# Patient Record
Sex: MALE | Race: BLACK OR AFRICAN AMERICAN | NOT HISPANIC OR LATINO | Employment: UNEMPLOYED | URBAN - METROPOLITAN AREA
[De-identification: names, ages, dates, MRNs, and addresses within clinical notes are randomized per-mention and may not be internally consistent; named-entity substitution may affect disease eponyms.]

---

## 2017-10-05 ENCOUNTER — HOSPITAL ENCOUNTER (EMERGENCY)
Facility: HOSPITAL | Age: 22
Discharge: HOME | End: 2017-10-05
Payer: MEDICAID

## 2017-10-05 VITALS
DIASTOLIC BLOOD PRESSURE: 64 MMHG | HEIGHT: 70 IN | OXYGEN SATURATION: 96 % | HEART RATE: 85 BPM | TEMPERATURE: 98 F | BODY MASS INDEX: 30.21 KG/M2 | RESPIRATION RATE: 18 BRPM | WEIGHT: 211 LBS | SYSTOLIC BLOOD PRESSURE: 117 MMHG

## 2017-10-05 DIAGNOSIS — T14.8XXA MUSCLE STRAIN: Primary | ICD-10-CM

## 2017-10-05 PROCEDURE — 63600175 PHARM REV CODE 636 W HCPCS: Performed by: PHYSICIAN ASSISTANT

## 2017-10-05 PROCEDURE — 96372 THER/PROPH/DIAG INJ SC/IM: CPT

## 2017-10-05 PROCEDURE — 99283 EMERGENCY DEPT VISIT LOW MDM: CPT | Mod: 25

## 2017-10-05 RX ORDER — ORPHENADRINE CITRATE 30 MG/ML
30 INJECTION INTRAMUSCULAR; INTRAVENOUS
Status: COMPLETED | OUTPATIENT
Start: 2017-10-05 | End: 2017-10-05

## 2017-10-05 RX ORDER — KETOROLAC TROMETHAMINE 30 MG/ML
10 INJECTION, SOLUTION INTRAMUSCULAR; INTRAVENOUS
Status: COMPLETED | OUTPATIENT
Start: 2017-10-05 | End: 2017-10-05

## 2017-10-05 RX ORDER — KETOROLAC TROMETHAMINE 10 MG/1
10 TABLET, FILM COATED ORAL 3 TIMES DAILY PRN
Qty: 20 TABLET | Refills: 0 | Status: SHIPPED | OUTPATIENT
Start: 2017-10-05 | End: 2018-03-10

## 2017-10-05 RX ORDER — ORPHENADRINE CITRATE 100 MG/1
100 TABLET, EXTENDED RELEASE ORAL 2 TIMES DAILY PRN
Qty: 20 TABLET | Refills: 0 | Status: SHIPPED | OUTPATIENT
Start: 2017-10-05 | End: 2017-10-15

## 2017-10-05 RX ADMIN — ORPHENADRINE CITRATE 30 MG: 30 INJECTION INTRAMUSCULAR; INTRAVENOUS at 07:10

## 2017-10-05 RX ADMIN — KETOROLAC TROMETHAMINE 10 MG: 30 INJECTION, SOLUTION INTRAMUSCULAR at 07:10

## 2017-10-05 NOTE — DISCHARGE INSTRUCTIONS
Take medications as prescribed.  Follow-up with primary care physician this week for reevaluation of symptoms.  Return to this ED if any problems occur.

## 2017-10-05 NOTE — ED PROVIDER NOTES
Encounter Date: 10/5/2017    SCRIBE #1 NOTE: I, Michelle Younger, am scribing for, and in the presence of,  Deric Carlson PA-C. I have scribed the following portions of the note - Other sections scribed: HPI and ROS.       History     Chief Complaint   Patient presents with    Back Pain     States he has upper right back pain by his neck since early this morning and describes it as a stinging pain     CC: Back Pain    HPI: This 21 y.o. Male presents to the ED for an evaluation of acute, constant, 10/10 right upper back pain with associated right sided neck pain that began early this morning.  Patient reports his pain began while lying down watching television.  Patient reports a past history of lower back pain described as spasms.  Patient reports his neck pain is worse with turning his head.  Patient denies fever, chills, nausea, emesis, diarrhea, abdominal pain, chest pain, shortness of breath, extremity numbness, extremity weakness, rash, or any other associated symptoms.  No prior tx.  No alleviating factors.      The history is provided by the patient. No  was used.     Review of patient's allergies indicates:  No Known Allergies  No past medical history on file.  No past surgical history on file.  Family History   Problem Relation Age of Onset    Diabetes Mother     Diabetes Father     Diabetes Maternal Grandmother     Diabetes Paternal Grandmother     Hypertension Maternal Grandmother     Asthma Brother     Cancer Maternal Grandfather      LUNG    Heart failure Maternal Grandfather      Social History   Substance Use Topics    Smoking status: Never Smoker    Smokeless tobacco: Never Used    Alcohol use No      Comment: OCCAS.     Review of Systems   Constitutional: Negative for chills and fever.   HENT: Negative for ear pain and sore throat.    Eyes: Negative for pain.   Respiratory: Negative for cough and shortness of breath.    Cardiovascular: Negative for chest pain.    Gastrointestinal: Negative for abdominal pain, diarrhea, nausea and vomiting.   Genitourinary: Negative for dysuria.   Musculoskeletal: Positive for back pain and neck pain.   Skin: Negative for rash.   Neurological: Negative for weakness, numbness and headaches.       Physical Exam     Initial Vitals [10/05/17 0712]   BP Pulse Resp Temp SpO2   127/72 63 18 97.9 °F (36.6 °C) 97 %      MAP       90.33         Physical Exam    Nursing note and vitals reviewed.  Constitutional: He appears well-developed and well-nourished. He is not diaphoretic. No distress.   HENT:   Head: Normocephalic and atraumatic.   Eyes: Conjunctivae and EOM are normal. Pupils are equal, round, and reactive to light.   Neck: Normal range of motion. Neck supple.   Cardiovascular: Normal heart sounds.   Pulmonary/Chest: Breath sounds normal. No respiratory distress. He has no wheezes. He has no rhonchi. He exhibits no tenderness.   Abdominal: Soft. Bowel sounds are normal. He exhibits no distension and no mass. There is no tenderness. There is no rebound and no guarding.   Musculoskeletal:   Mild ttp to R posterior trapeziua, lateral SCM. No obvious swelling or mass. No bony tenderness. Mild discomfort with active neck range of motion, but full range of motion.  No crepitus.  No midline spinal C/T/L tenderness to palpation.   Neurological: He is alert and oriented to person, place, and time. He has normal strength.   Skin: Skin is warm and dry. Capillary refill takes less than 2 seconds. No rash and no abscess noted. No erythema.   Psychiatric: He has a normal mood and affect. His behavior is normal. Judgment and thought content normal.         ED Course   Procedures  Labs Reviewed - No data to display          Medical Decision Making:   Initial Assessment:   21-year-old male chief complaint right neck and upper back stiffness and pain since this morning.  Differential Diagnosis:   Muscle strain, fracture, contusion  ED Management:  Patient  overall well-appearing, in no acute distress, afebrile, vitals within normal limits.    Patient presents to ED complaining ofacute right-sided neck and upper back discomfort after waking up this morning.  Patient denies recent heavy lifting or strenuous activity. Patient denies history of neck/upper back issues.  He admits to discomfort with active range of motion of neck.  Neck remains supple.  There is full range of motion.  He denies headache.  There is no meningismus.  No midline spinal C/T/L tenderness to palpation.  Mild TTP right posterior trapezius, right lateral SCM.  No swelling, no mass.  No bony deformity or step-off.  Overall, suspect muscle strain.  I will discharge with Norflex and Toradol in the patient follow with primary care physician.  He does understand and agree.  I've asked him to return to this ED if any other problems occur.  He is afebrile, vitals are reassuring.  I do feel he is safe and stable for discharge.            Scribe Attestation:   Scribe #1: I performed the above scribed service and the documentation accurately describes the services I performed. I attest to the accuracy of the note.    Attending Attestation:           Physician Attestation for Scribe:  Physician Attestation Statement for Scribe #1: I, Deric Carlson PA-C, reviewed documentation, as scribed by Michelle Younger in my presence, and it is both accurate and complete.                 ED Course      Clinical Impression:   The encounter diagnosis was Muscle strain.    Disposition:   Disposition: Discharged  Condition: Stable                        Deric Carlson PA-C  10/05/17 0815

## 2018-03-10 ENCOUNTER — HOSPITAL ENCOUNTER (EMERGENCY)
Facility: HOSPITAL | Age: 23
Discharge: HOME | End: 2018-03-10
Attending: EMERGENCY MEDICINE
Payer: MEDICAID

## 2018-03-10 VITALS
OXYGEN SATURATION: 94 % | SYSTOLIC BLOOD PRESSURE: 146 MMHG | WEIGHT: 180 LBS | TEMPERATURE: 99 F | HEIGHT: 70 IN | HEART RATE: 90 BPM | DIASTOLIC BLOOD PRESSURE: 66 MMHG | BODY MASS INDEX: 25.77 KG/M2 | RESPIRATION RATE: 18 BRPM

## 2018-03-10 DIAGNOSIS — J06.9 VIRAL URI: ICD-10-CM

## 2018-03-10 DIAGNOSIS — R68.89 FLU-LIKE SYMPTOMS: Primary | ICD-10-CM

## 2018-03-10 DIAGNOSIS — R05.9 COUGH: ICD-10-CM

## 2018-03-10 PROCEDURE — 63600175 PHARM REV CODE 636 W HCPCS: Performed by: NURSE PRACTITIONER

## 2018-03-10 PROCEDURE — 96372 THER/PROPH/DIAG INJ SC/IM: CPT

## 2018-03-10 PROCEDURE — 25000003 PHARM REV CODE 250: Performed by: NURSE PRACTITIONER

## 2018-03-10 PROCEDURE — 99284 EMERGENCY DEPT VISIT MOD MDM: CPT | Mod: 25

## 2018-03-10 RX ORDER — BENZONATATE 100 MG/1
100 CAPSULE ORAL 3 TIMES DAILY PRN
Qty: 20 CAPSULE | Refills: 0 | Status: SHIPPED | OUTPATIENT
Start: 2018-03-10 | End: 2018-03-20

## 2018-03-10 RX ORDER — KETOROLAC TROMETHAMINE 30 MG/ML
10 INJECTION, SOLUTION INTRAMUSCULAR; INTRAVENOUS
Status: COMPLETED | OUTPATIENT
Start: 2018-03-10 | End: 2018-03-10

## 2018-03-10 RX ORDER — OSELTAMIVIR PHOSPHATE 75 MG/1
75 CAPSULE ORAL
Status: COMPLETED | OUTPATIENT
Start: 2018-03-10 | End: 2018-03-10

## 2018-03-10 RX ORDER — AZELASTINE 1 MG/ML
1 SPRAY, METERED NASAL 2 TIMES DAILY
Qty: 30 ML | Refills: 0 | OUTPATIENT
Start: 2018-03-10 | End: 2023-05-07

## 2018-03-10 RX ORDER — OSELTAMIVIR PHOSPHATE 75 MG/1
75 CAPSULE ORAL 2 TIMES DAILY
Qty: 10 CAPSULE | Refills: 0 | Status: SHIPPED | OUTPATIENT
Start: 2018-03-11 | End: 2018-03-16

## 2018-03-10 RX ORDER — EFAVIRENZ, EMTRICITABINE AND TENOFOVIR DISOPROXIL FUMARATE 600; 200; 300 MG/1; MG/1; MG/1
1 TABLET, FILM COATED ORAL NIGHTLY
COMMUNITY
End: 2022-06-11

## 2018-03-10 RX ADMIN — OSELTAMIVIR PHOSPHATE 75 MG: 75 CAPSULE ORAL at 07:03

## 2018-03-10 RX ADMIN — KETOROLAC TROMETHAMINE 10 MG: 30 INJECTION, SOLUTION INTRAMUSCULAR at 07:03

## 2018-03-11 NOTE — ED PROVIDER NOTES
Encounter Date: 3/10/2018    SCRIBE #1 NOTE: I, Jinny Leyva, am scribing for, and in the presence of,  Mariposa Bryant NP. I have scribed the following portions of the note - Other sections scribed: HPI and ROS.       History     Chief Complaint   Patient presents with    Cough     bodyaches x 3 days; cough x 2 days ago with fever; denies N/V/D; reports loose stools    Generalized Body Aches     CC: Cough    HPI: This 22 y.o. male with a medical history of HIV presents to the ED c/o a productive cough accompanied by green mucus for the past 3x days. Pt reports that he has also been experiencing chest pain (with coughing), shortness of breath (when taking deep breath, coughing or laughing), congestion, rhinorrhea, generalized body aches, fatigue, decreased appetite, subjective fever and chills. He reports taking Theraflu (last taken yesterday), Nyquil (taken PTA to the ED) and Tylenol (last taken at 10:00 am this morning) for treatment. Symptoms are acute, constant and severe (9/10). Pt reports that he received a flu vaccination this year, but notes that he has been in contact with individuals with similar symptoms. Pt denies ear pain, eye pain, sore throat, abdominal pain, nausea, emesis, diarrhea, dysuria and urinary frequency. No other associated symptoms. No alleviating factors.     Pt reports that he was diagnosed with HIV in 2014 and notes that he was last evaluated in December 2017 (notes he has been missing appointments due to work). He states that he is compliant with his medications and notes that the virus is currently undetectable.           The history is provided by the patient. No  was used.     Review of patient's allergies indicates:  No Known Allergies  Past Medical History:   Diagnosis Date    HIV (human immunodeficiency virus infection)      History reviewed. No pertinent surgical history.  Family History   Problem Relation Age of Onset    Cancer Maternal Grandfather       LUNG    Heart failure Maternal Grandfather     Diabetes Mother     Diabetes Father     Diabetes Maternal Grandmother     Hypertension Maternal Grandmother     Diabetes Paternal Grandmother     Asthma Brother      Social History   Substance Use Topics    Smoking status: Never Smoker    Smokeless tobacco: Never Used    Alcohol use No      Comment: OCCAS.     Review of Systems   Constitutional: Positive for appetite change (decreased), chills, fatigue and fever (subjective).   HENT: Positive for congestion and rhinorrhea. Negative for ear pain and sore throat.    Eyes: Negative for pain.   Respiratory: Positive for cough (productive; accompanied by green mucus) and shortness of breath (when taking a deep breath, coughing or laughing).    Cardiovascular: Positive for chest pain (when coughing).   Gastrointestinal: Negative for abdominal pain, diarrhea, nausea and vomiting.   Genitourinary: Negative for dysuria and frequency.   Musculoskeletal: Positive for myalgias (generalized).   Skin: Negative for rash.       Physical Exam     Initial Vitals [03/10/18 1822]   BP Pulse Resp Temp SpO2   131/61 88 20 98.3 °F (36.8 °C) 95 %      MAP       84.33         Physical Exam    Vitals reviewed.  Constitutional: Vital signs are normal. He appears well-developed and well-nourished. He is not diaphoretic.  Non-toxic appearance. He does not have a sickly appearance. He does not appear ill. No distress.   HENT:   Head: Normocephalic and atraumatic.   Right Ear: Hearing, tympanic membrane, external ear and ear canal normal. Tympanic membrane is not erythematous and not bulging. No middle ear effusion.   Left Ear: Hearing, tympanic membrane, external ear and ear canal normal. Tympanic membrane is not erythematous and not bulging.  No middle ear effusion.   Nose: Mucosal edema and rhinorrhea present. Right sinus exhibits no maxillary sinus tenderness and no frontal sinus tenderness. Left sinus exhibits no maxillary sinus  tenderness and no frontal sinus tenderness.   Mouth/Throat: Uvula is midline, oropharynx is clear and moist and mucous membranes are normal. Mucous membranes are not pale, not dry and not cyanotic. No oral lesions. No trismus in the jaw. No uvula swelling. No oropharyngeal exudate, posterior oropharyngeal edema, posterior oropharyngeal erythema or tonsillar abscesses.   Eyes: Conjunctivae and EOM are normal. Pupils are equal, round, and reactive to light. Right eye exhibits no discharge. Left eye exhibits no discharge.   Neck: Normal range of motion and full passive range of motion without pain. Neck supple. No thyromegaly present. No tracheal deviation present. No JVD present.   Cardiovascular: Normal rate, regular rhythm and normal heart sounds. Exam reveals no gallop and no friction rub.    No murmur heard.  Pulmonary/Chest: Breath sounds normal. No stridor. No respiratory distress. He has no decreased breath sounds. He has no wheezes. He has no rhonchi. He has no rales. He exhibits no tenderness.   Abdominal: Soft. Bowel sounds are normal. He exhibits no distension and no mass. There is no hepatosplenomegaly. There is no tenderness. There is no rigidity, no rebound, no guarding, no CVA tenderness, no tenderness at McBurney's point and negative Hinojosa's sign.   Musculoskeletal: Normal range of motion.   Lymphadenopathy:     He has no cervical adenopathy.   Neurological: He is alert and oriented to person, place, and time. GCS eye subscore is 4. GCS verbal subscore is 5. GCS motor subscore is 6.   Skin: Skin is warm, dry and intact. No rash noted. No erythema.   Psychiatric: He has a normal mood and affect. Thought content normal.         ED Course   Procedures  Labs Reviewed - No data to display          Medical Decision Making:   ED Management:  This is an evaluation of a 22 y.o. male that presents to the Emergency Department for cough, body aches, rhinorrhea and nasal congestion for 3 days. The patient is a  non-toxic, afebrile, and well appearing male. On physical exam ears and pharynx are without evidence of infection. Appears well hydrated with moist mucus membranes. Neck soft and supple with no meningeal signs or cervical lymphadenopathy. Breath sounds are clear and equal bilaterally with no adventitious breath sounds, tachypnea or respiratory distress with room air pulse ox of 95% and no evidence of hypoxia.  Abdomen is soft and nontender with palpation.    Vital Signs Are Reassuring.  RESULTS:   Chest x-ray PA and lateral with no acute cardiopulmonary abnormality.    My overall impression is flu-like symptoms. I considered, but at this time, do not suspect OM, OE, strep pharyngitis, meningitis, pneumonia, or acute bacterial sinusitis.    He is well-appearing.  Vital signs are within normal limits.  He is afebrile.  He is not tachycardic.  He reports good control of HIV with undetectable viral load.  He has flulike symptoms, therefore I will treat him with Tamiflu.  He was instructed to return to the emergency Department for fever, chills, any worsening of symptoms, or if he does not improve.  He instructed to call his PCP and infectious disease doctor soon as possible for follow-up.Case was discussed with Dr. Aly does not believe the patient requires any labs or further workup at this time.     ED Course: Tamiflu, Toradol. D/C Meds: Tamiflu, Astelin nasal spray,Tessalon Perles.The diagnosis, treatment plan, instructions for follow-up and reevaluation with PCP and ID as well as ED return precautions were discussed and understanding was verbalized. All questions or concerns have been addressed.     This case was discussed with Dr. Aly who is in agreement with my assessment and plan.             Scribe Attestation:   Scribe #1: I performed the above scribed service and the documentation accurately describes the services I performed. I attest to the accuracy of the note.    Attending Attestation:   Physician  Attestation Statement for Resident:  As the supervising MD   Physician Attestation Statement: I have personally seen and examined this patient.    Physician Attestation Statement for NP/PA:   I discussed this assessment and plan of this patient with the NP/PA, but I did not personally examine the patient. The face to face encounter was performed by the NP/PA.        Physician Attestation for Scribe:  Physician Attestation Statement for Scribe #1: I, Mariposa Bryant NP, reviewed documentation, as scribed by Jinny Leyva in my presence, and it is both accurate and complete.                    Clinical Impression:   The primary encounter diagnosis was Flu-like symptoms. Diagnoses of Cough and Viral URI were also pertinent to this visit.    Disposition:   Disposition: Discharged  Condition: Stable                        Mariposa Bryant NP  03/11/18 2010       Teodoro Aly MD  03/14/18 1822

## 2018-03-11 NOTE — DISCHARGE INSTRUCTIONS
You have been prescribed Tamiflu.  You were given your first dose of this medication tonight in the emergency department.  Begin taking this medication tomorrow.  Table take it twice a day for 5 days.  Alternate ibuprofen and Tylenol as needed for fever.  Call as soon as possible to schedule follow-up appointment with your infectious disease doctor.    Please return to the Emergency Department for any new, worrisome, or worsening symptoms including: Abdominal pain, fever, chest pain, shortness of breath, loss of consciousness, dizziness, weakness, or any other concerns.     Please follow up with your Primary Care Provider within in the week. If you do not have one, you may contact the one listed on your discharge paperwork or you may also call the [facility] Appointment Desk at [phone] to schedule an appointment with one.     Please take all medication as prescribed.

## 2018-03-11 NOTE — ED TRIAGE NOTES
Pt states he has been coughing x3 days. C/o body aches, chills, congestion, possible fever, loss of apettie. Denies sore throat, N/V.   Pt took tylenol, theraflu, and nyquil PTA.

## 2021-05-21 ENCOUNTER — HOSPITAL ENCOUNTER (EMERGENCY)
Facility: HOSPITAL | Age: 26
Discharge: HOME | End: 2021-05-21
Attending: EMERGENCY MEDICINE

## 2021-05-21 VITALS
HEART RATE: 72 BPM | TEMPERATURE: 99 F | HEIGHT: 70 IN | BODY MASS INDEX: 30.06 KG/M2 | OXYGEN SATURATION: 97 % | SYSTOLIC BLOOD PRESSURE: 140 MMHG | WEIGHT: 210 LBS | RESPIRATION RATE: 17 BRPM | DIASTOLIC BLOOD PRESSURE: 73 MMHG

## 2021-05-21 DIAGNOSIS — S39.012A ACUTE MYOFASCIAL STRAIN OF LUMBAR REGION, INITIAL ENCOUNTER: Primary | ICD-10-CM

## 2021-05-21 PROCEDURE — 63600175 PHARM REV CODE 636 W HCPCS: Performed by: NURSE PRACTITIONER

## 2021-05-21 PROCEDURE — 96372 THER/PROPH/DIAG INJ SC/IM: CPT

## 2021-05-21 PROCEDURE — 99284 EMERGENCY DEPT VISIT MOD MDM: CPT | Mod: 25

## 2021-05-21 RX ORDER — TIZANIDINE 4 MG/1
4 TABLET ORAL EVERY 8 HOURS PRN
Qty: 20 TABLET | Refills: 0 | OUTPATIENT
Start: 2021-05-21 | End: 2022-06-11

## 2021-05-21 RX ORDER — NAPROXEN 500 MG/1
500 TABLET ORAL EVERY 12 HOURS PRN
Qty: 20 TABLET | Refills: 0 | OUTPATIENT
Start: 2021-05-21 | End: 2022-06-11

## 2021-05-21 RX ORDER — DEXAMETHASONE SODIUM PHOSPHATE 4 MG/ML
12 INJECTION, SOLUTION INTRA-ARTICULAR; INTRALESIONAL; INTRAMUSCULAR; INTRAVENOUS; SOFT TISSUE
Status: COMPLETED | OUTPATIENT
Start: 2021-05-21 | End: 2021-05-21

## 2021-05-21 RX ADMIN — DEXAMETHASONE SODIUM PHOSPHATE 12 MG: 4 INJECTION INTRA-ARTICULAR; INTRALESIONAL; INTRAMUSCULAR; INTRAVENOUS; SOFT TISSUE at 07:05

## 2022-01-03 ENCOUNTER — LAB VISIT (OUTPATIENT)
Dept: PRIMARY CARE CLINIC | Facility: OTHER | Age: 27
End: 2022-01-03
Payer: OTHER GOVERNMENT

## 2022-01-03 DIAGNOSIS — R05.9 COUGH: ICD-10-CM

## 2022-01-03 PROCEDURE — U0003 INFECTIOUS AGENT DETECTION BY NUCLEIC ACID (DNA OR RNA); SEVERE ACUTE RESPIRATORY SYNDROME CORONAVIRUS 2 (SARS-COV-2) (CORONAVIRUS DISEASE [COVID-19]), AMPLIFIED PROBE TECHNIQUE, MAKING USE OF HIGH THROUGHPUT TECHNOLOGIES AS DESCRIBED BY CMS-2020-01-R: HCPCS | Performed by: INTERNAL MEDICINE

## 2022-01-05 ENCOUNTER — PATIENT MESSAGE (OUTPATIENT)
Dept: ADMINISTRATIVE | Facility: OTHER | Age: 27
End: 2022-01-05
Payer: OTHER GOVERNMENT

## 2022-01-07 LAB
SARS-COV-2 RNA RESP QL NAA+PROBE: NORMAL
TEST PERFORMANCE INFO SPEC: NORMAL

## 2022-06-11 ENCOUNTER — HOSPITAL ENCOUNTER (EMERGENCY)
Facility: HOSPITAL | Age: 27
Discharge: HOME | End: 2022-06-11
Attending: EMERGENCY MEDICINE
Payer: COMMERCIAL

## 2022-06-11 VITALS
OXYGEN SATURATION: 97 % | DIASTOLIC BLOOD PRESSURE: 82 MMHG | SYSTOLIC BLOOD PRESSURE: 123 MMHG | RESPIRATION RATE: 20 BRPM | TEMPERATURE: 98 F | BODY MASS INDEX: 30.35 KG/M2 | WEIGHT: 212 LBS | HEIGHT: 70 IN | HEART RATE: 77 BPM

## 2022-06-11 DIAGNOSIS — S76.112A QUADRICEPS STRAIN, LEFT, INITIAL ENCOUNTER: ICD-10-CM

## 2022-06-11 DIAGNOSIS — M54.42 ACUTE LEFT-SIDED LOW BACK PAIN WITH LEFT-SIDED SCIATICA: Primary | ICD-10-CM

## 2022-06-11 PROCEDURE — 96372 THER/PROPH/DIAG INJ SC/IM: CPT | Performed by: NURSE PRACTITIONER

## 2022-06-11 PROCEDURE — 63600175 PHARM REV CODE 636 W HCPCS: Performed by: NURSE PRACTITIONER

## 2022-06-11 PROCEDURE — 99284 EMERGENCY DEPT VISIT MOD MDM: CPT | Mod: 25

## 2022-06-11 PROCEDURE — 25000003 PHARM REV CODE 250: Performed by: NURSE PRACTITIONER

## 2022-06-11 RX ORDER — KETOROLAC TROMETHAMINE 30 MG/ML
30 INJECTION, SOLUTION INTRAMUSCULAR; INTRAVENOUS
Status: COMPLETED | OUTPATIENT
Start: 2022-06-11 | End: 2022-06-11

## 2022-06-11 RX ORDER — HYDROCODONE BITARTRATE AND ACETAMINOPHEN 5; 325 MG/1; MG/1
1 TABLET ORAL EVERY 4 HOURS PRN
Qty: 18 TABLET | Refills: 0 | OUTPATIENT
Start: 2022-06-11 | End: 2023-11-01

## 2022-06-11 RX ORDER — LIDOCAINE 50 MG/G
1 PATCH TOPICAL
Status: DISCONTINUED | OUTPATIENT
Start: 2022-06-11 | End: 2022-06-11 | Stop reason: HOSPADM

## 2022-06-11 RX ORDER — METHYLPREDNISOLONE ACETATE 80 MG/ML
80 INJECTION, SUSPENSION INTRA-ARTICULAR; INTRALESIONAL; INTRAMUSCULAR; SOFT TISSUE
Status: COMPLETED | OUTPATIENT
Start: 2022-06-11 | End: 2022-06-11

## 2022-06-11 RX ORDER — LIDOCAINE 50 MG/G
1 PATCH TOPICAL DAILY
Qty: 5 PATCH | Refills: 0 | Status: SHIPPED | OUTPATIENT
Start: 2022-06-11

## 2022-06-11 RX ORDER — DIAZEPAM 10 MG/2ML
10 INJECTION INTRAMUSCULAR
Status: COMPLETED | OUTPATIENT
Start: 2022-06-11 | End: 2022-06-11

## 2022-06-11 RX ORDER — BICTEGRAVIR SODIUM, EMTRICITABINE, AND TENOFOVIR ALAFENAMIDE FUMARATE 50; 200; 25 MG/1; MG/1; MG/1
1 TABLET ORAL DAILY
COMMUNITY
Start: 2022-03-17

## 2022-06-11 RX ORDER — NAPROXEN 500 MG/1
500 TABLET ORAL 2 TIMES DAILY WITH MEALS
Qty: 10 TABLET | Refills: 0 | Status: SHIPPED | OUTPATIENT
Start: 2022-06-11 | End: 2022-06-16

## 2022-06-11 RX ORDER — METHOCARBAMOL 500 MG/1
1000 TABLET, FILM COATED ORAL 3 TIMES DAILY
Qty: 30 TABLET | Refills: 0 | Status: SHIPPED | OUTPATIENT
Start: 2022-06-11 | End: 2022-06-16

## 2022-06-11 RX ADMIN — DIAZEPAM 10 MG: 10 INJECTION, SOLUTION INTRAMUSCULAR; INTRAVENOUS at 05:06

## 2022-06-11 RX ADMIN — LIDOCAINE 1 PATCH: 50 PATCH TOPICAL at 05:06

## 2022-06-11 RX ADMIN — METHYLPREDNISOLONE ACETATE 80 MG: 80 INJECTION, SUSPENSION INTRA-ARTICULAR; INTRALESIONAL; INTRAMUSCULAR; SOFT TISSUE at 05:06

## 2022-06-11 RX ADMIN — KETOROLAC TROMETHAMINE 30 MG: 30 INJECTION, SOLUTION INTRAMUSCULAR at 05:06

## 2022-06-11 NOTE — ED PROVIDER NOTES
"Source of History:  Patient, mother, chart    Chief complaint:  Back Pain (Pt c/o of back to lower left back. Pt stated he hurt his back dancing Thursday and has worsened today down the left leg. Pt denied falling.)      HPI:  Bladimir Calderon is a 26 y.o. male presenting with left low back pain radiating down left leg.  Patient states he initially had an injury while dancing 2 days ago.  Patient states today while dancing he felt a pop in his lower back and pain radiating down his left leg.  Patient denies taking anything for pain today.  Patient denies any numbness or tingling in groin.  No bowel or bladder incontinence.    This is the extent to the patients complaints today here in the emergency department.    ROS: As per HPI and below:  Constitutional: No fever.  No chills.  Eyes: No visual changes.   ENT: No sore throat. No ear pain.  Urinary: No abnormal urination.  MSK:  Positive for back pain.  Positive for left leg pain.  Integument: No rashes or lesions.    Review of patient's allergies indicates:  No Known Allergies    PMH:  As per HPI and below:  Past Medical History:   Diagnosis Date    HIV (human immunodeficiency virus infection)      No past surgical history on file.    Social History     Tobacco Use    Smoking status: Never Smoker    Smokeless tobacco: Never Used   Substance Use Topics    Alcohol use: No     Comment: OCCAS.    Drug use: No       Physical Exam:    /86 (BP Location: Right arm, Patient Position: Sitting)   Pulse 78   Temp 98.1 °F (36.7 °C) (Oral)   Resp 20   Ht 5' 10" (1.778 m)   Wt 96.2 kg (212 lb)   SpO2 97%   BMI 30.42 kg/m²   Nursing note and vital signs reviewed.  Constitutional: No acute distress.  Nontoxic  Head:  Normocephalic atraumatic  Eyes: No conjunctival injection.  Extraocular muscles are intact.  ENT: Normal phonation.  Musculoskeletal:  Left lumbar pain- worse with lumbar flexion and extension.  No midline lumbar tenderness to palpation. No saddle " anesthesia. Tenderness to palpation to left quadriceps.  No bulging or signs of tear noted.  No bruising or ecchymosis noted.  No swelling on exam.  Skin: No rashes seen.  Good turgor.  No abrasions.  No ecchymoses.  Psych: Appropriate, conversant.    I decided to obtain the patient's medical records.      MDM/ Differential Dx:   Emergent evaluation of a 27 yo male presenting for left lower back pain and left quadriceps pain.  Patient states back pain initially started on Thursday.  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  On exam pt is A&Ox3. VSS. Nonfebrile and nontoxic appearing.  Distressed due to pain.  Left lumbar pain- worse with lumbar flexion and extension.  No midline lumbar tenderness to palpation. No saddle anesthesia. Tenderness to palpation to left quadriceps.  No bulging or signs of tear noted.  No bruising or ecchymosis noted.  No swelling on exam.  Cap refill < 3 seconds.  Plus two DP noted.    Differential diagnoses include but are not limited to lumbar strain, sprain, contusion, abrasion      I will medicate and reassess.    ED Course as of 06/11/22 1717   Sat Jun 11, 2022   1717 Patient advised use ice or heat for comfort.  Take medications as prescribed.  Maintain movement and stretches.  Follow-up with PCP as needed.  Strict return to ED precautions discussed.  Patient verbalized understanding of this plan of care.  All questions and concerns addressed. [RZ]   1717 Patient is hemodynamically stable, vital signs are normal. Discharge instructions given. Return to ED precautions discussed. Follow up as directed. Pt verbalized understanding of this plan.  Pt is stable for discharge.  [RZ]      ED Course User Index  [RZ] Rhonda Magallanes NP               Diagnostic Impression:    1. Acute left-sided low back pain with left-sided sciatica    2. Quadriceps strain, left, initial encounter         ED Disposition  Condition    Discharge Stable          ED Prescriptions     Medication Sig Dispense Start Date End Date Auth. Provider    methocarbamoL (ROBAXIN) 500 MG Tab Take 2 tablets (1,000 mg total) by mouth 3 (three) times daily. for 5 days 30 tablet 6/11/2022 6/16/2022 Rhonda Magallanes NP    naproxen (NAPROSYN) 500 MG tablet Take 1 tablet (500 mg total) by mouth 2 (two) times daily with meals. for 5 days 10 tablet 6/11/2022 6/16/2022 Rhonda Magallanes NP    LIDOcaine (LIDODERM) 5 % Place 1 patch onto the skin once daily. 5 patch 6/11/2022  Rhonda Magallanes NP    HYDROcodone-acetaminophen (NORCO) 5-325 mg per tablet Take 1 tablet by mouth every 4 (four) hours as needed for Pain. 18 tablet 6/11/2022  Rhonda Magallanes NP        Follow-up Information     Follow up With Specialties Details Why Contact Info    Sammi Carmona MD Pediatrics Schedule an appointment as soon as possible for a visit  As needed [address]  [address]  [facility]  [facility] [address]  [phone]               Rhonda Magallanes NP  06/11/22 5450

## 2022-06-11 NOTE — DISCHARGE INSTRUCTIONS
You were seen and evaluated in the ER today.  We have treated you for lumbar strain and quadriceps strain.  Please take medications as prescribed.  Ice or heat for comfort.  Maintain movement and stretches.  Please follow-up with your PCP as needed.  Please return to the ED for any worsening symptoms such as chest pain, shortness of breath, fever not controlled with Tylenol or ibuprofen or uncontrolled pain.      Our goal in the emergency department is to always give you outstanding care and exceptional service. You may receive a survey by mail or e-mail in the next week regarding your experience in our ED. We would greatly appreciate your completing and returning the survey. Your feedback provides us with a way to recognize our staff who give very good care and it helps us learn how to improve when your experience was below our aspiration of excellence.

## 2022-07-07 ENCOUNTER — HOSPITAL ENCOUNTER (EMERGENCY)
Facility: HOSPITAL | Age: 27
Discharge: HOME | End: 2022-07-07
Attending: EMERGENCY MEDICINE
Payer: COMMERCIAL

## 2022-07-07 VITALS
TEMPERATURE: 99 F | HEART RATE: 73 BPM | RESPIRATION RATE: 18 BRPM | BODY MASS INDEX: 30.06 KG/M2 | HEIGHT: 70 IN | SYSTOLIC BLOOD PRESSURE: 131 MMHG | WEIGHT: 210 LBS | OXYGEN SATURATION: 97 % | DIASTOLIC BLOOD PRESSURE: 86 MMHG

## 2022-07-07 DIAGNOSIS — S39.012A STRAIN OF LUMBAR REGION, INITIAL ENCOUNTER: ICD-10-CM

## 2022-07-07 DIAGNOSIS — J02.9 VIRAL PHARYNGITIS: Primary | ICD-10-CM

## 2022-07-07 LAB
CTP QC/QA: YES
MOLECULAR STREP A: NEGATIVE
POC MOLECULAR INFLUENZA A AGN: NEGATIVE
POC MOLECULAR INFLUENZA B AGN: NEGATIVE
SARS-COV-2 RDRP RESP QL NAA+PROBE: NEGATIVE

## 2022-07-07 PROCEDURE — 87502 INFLUENZA DNA AMP PROBE: CPT

## 2022-07-07 PROCEDURE — U0002 COVID-19 LAB TEST NON-CDC: HCPCS | Performed by: EMERGENCY MEDICINE

## 2022-07-07 PROCEDURE — 99284 EMERGENCY DEPT VISIT MOD MDM: CPT

## 2022-07-07 RX ORDER — MELOXICAM 7.5 MG/1
7.5 TABLET ORAL DAILY
Qty: 12 TABLET | Refills: 0 | Status: SHIPPED | OUTPATIENT
Start: 2022-07-07 | End: 2023-11-01 | Stop reason: ALTCHOICE

## 2022-07-07 RX ORDER — ORPHENADRINE CITRATE 100 MG/1
100 TABLET, EXTENDED RELEASE ORAL 2 TIMES DAILY
Qty: 8 TABLET | Refills: 0 | Status: SHIPPED | OUTPATIENT
Start: 2022-07-07 | End: 2022-07-11

## 2022-07-07 NOTE — ED PROVIDER NOTES
"Encounter Date: 7/7/2022    SCRIBE #1 NOTE: I, Delon Schneider, am scribing for, and in the presence of,  Mg Noguera PA-C. I have scribed the following portions of the note - Other sections scribed: HPI & ROS.       History     Chief Complaint   Patient presents with    Sore Throat     Pt reports sore throat, headache, lower back pain since yesterday. 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 Denies recent COVID exposure.     This is a 26 y.o. male, with a PMHx of HIV and chronic back pain, who presents to the ED with multiple complaints. Patient reporting 1 day of atraumatic, positional lower back pain that he describes as "sharp" and non radiating. Reports symptoms feel similar to chronic back pain. Patient additionally complaining of phalangitis, nasal congestion, cough, and headache. Reports Tylenol was taken PTA. Denies recent sick contact. No other exacerbating or alleviating factors. No other associated symptoms at this time.     The history is provided by the patient. No  was used.     Review of patient's allergies indicates:  No Known Allergies  Past Medical History:   Diagnosis Date    HIV (human immunodeficiency virus infection)      History reviewed. No pertinent surgical history.  Family History   Problem Relation Age of Onset    Cancer Maternal Grandfather         LUNG    Heart failure Maternal Grandfather     Diabetes Mother     Diabetes Father     Diabetes Maternal Grandmother     Hypertension Maternal Grandmother     Diabetes Paternal Grandmother     Asthma Brother      Social History     Tobacco Use    Smoking status: Never Smoker    Smokeless tobacco: Never Used   Substance Use Topics    Alcohol use: No     Comment: OCCAS.    Drug use: No     Review of Systems   Constitutional: Negative for fever.   HENT: Positive for congestion and sore throat.    Eyes: Negative for visual disturbance.   Respiratory: Positive for cough. Negative for " shortness of breath.    Cardiovascular: Negative for chest pain.   Gastrointestinal: Negative for abdominal pain, diarrhea, nausea and vomiting.   Genitourinary: Negative for dysuria.   Musculoskeletal: Positive for back pain (lower).   Skin: Negative for rash.   Neurological: Positive for headaches.   All other systems reviewed and are negative.      Physical Exam     Initial Vitals [07/07/22 1304]   BP Pulse Resp Temp SpO2   (!) 149/80 72 18 98.3 °F (36.8 °C) 95 %      MAP       --         Physical Exam    Nursing note and vitals reviewed.  Constitutional: He appears well-developed and well-nourished. He is not diaphoretic. No distress.   HENT:   Head: Atraumatic.   Right Ear: External ear normal.   Left Ear: External ear normal.   Mouth/Throat: Oropharynx is clear and moist.   Eyes: Conjunctivae are normal.   Neck: No tracheal deviation present.   Normal range of motion.  Cardiovascular: Normal rate and regular rhythm.   Pulmonary/Chest: No accessory muscle usage or stridor. No tachypnea. No respiratory distress.   Musculoskeletal:      Cervical back: Normal range of motion.      Comments: Reproducible TTP and with distracting movements of the b/l lower lumbar musculature. No midline tenderness or bony deformities noted down the neck and spine. No bony TTP to hips and has full ROM of hips. No overlying skin changes. Distal lower extremity pulses 2+ and equal. Sensation intact and equal. No foot drop. No lower extremity swelling or TTP.  Ambulating normally, without limp.     Neurological: He has normal strength. He displays no tremor. He displays no seizure activity. Coordination and gait normal.   Skin: Skin is intact. Capillary refill takes less than 2 seconds. No cyanosis.         ED Course   Procedures  Labs Reviewed   POCT INFLUENZA A/B MOLECULAR   SARS-COV-2 RDRP GENE   POCT STREP A MOLECULAR          Imaging Results    None          Medications - No data to display  Medical Decision Making:   History:    Old Medical Records: I decided to obtain old medical records.  Clinical Tests:   Lab Tests: Ordered and Reviewed  ED Management:  Multiple complaints.  URI symptoms likely viral.  COVID-19, flu, and rapid strep negative.  No peritonsillar abscess or deep space infection.  Afebrile.  No hypoxia or respiratory distress.  Low suspicion for bacterial pneumonia.  Back pain consistent with myofascial strain.  No history of trauma.  Low suspicion for epidural abscess and cauda equina.  No renal component today.  Advising follow-up with PCP. Strict return precautions discussed.  Agreeable to plan.          Scribe Attestation:   Scribe #1: I performed the above scribed service and the documentation accurately describes the services I performed. I attest to the accuracy of the note.                 Clinical Impression:   Final diagnoses:  [J02.9] Viral pharyngitis (Primary)  [S39.012A] Strain of lumbar region, initial encounter         I, Mg Noguera PA-C, personally performed the services described in this documentation. All medical record entries made by the scribe were at my direction and in my presence. I have reviewed the chart and agree that the record reflects my personal performance and is accurate and complete.     ED Disposition Condition    Discharge Stable        ED Prescriptions     Medication Sig Dispense Start Date End Date Auth. Provider    meloxicam (MOBIC) 7.5 MG tablet Take 1 tablet (7.5 mg total) by mouth once daily. 12 tablet 7/7/2022  Mg Noguera PA-C    orphenadrine (NORFLEX) 100 mg tablet Take 1 tablet (100 mg total) by mouth 2 (two) times daily. for 4 days 8 tablet 7/7/2022 7/11/2022 Mg Noguera PA-C    benzocaine-menthoL 15-20 mg Lozg Follows directions on packaging 16 lozenge 7/7/2022  Mg Noguera PA-C        Follow-up Information     Follow up With Specialties Details Why Contact Info    Sammi Carmona MD Pediatrics Schedule an appointment as soon as possible for a visit in 2  days For re-evaluation [address] [facility]  [address]  [facility] [facility] [address]  [facility] [address]  [phone]      [facility] - Emergency Dept Emergency Medicine Go to  If symptoms worsen [address]  [facility] [address] [phone]           Mg Noguera PA-C  07/07/22 3260

## 2022-07-07 NOTE — Clinical Note
"Bladimir BLANCA "Bladimir" Kvng was seen and treated in our emergency department on 7/7/2022.  He may return to work on 07/11/2022.       If you have any questions or concerns, please don't hesitate to call.      Mg Noguera PA-C"

## 2022-07-07 NOTE — DISCHARGE INSTRUCTIONS

## 2022-07-07 NOTE — ED TRIAGE NOTES
Bladimir Calderon, a 26 y.o. male, presents to ED via POV with complaints of sore throat, cough, runny nose, and back pain that started yesterday. Reports taking Tylenol PTA.

## 2023-05-02 ENCOUNTER — HOSPITAL ENCOUNTER (EMERGENCY)
Facility: HOSPITAL | Age: 28
Discharge: HOME | End: 2023-05-02
Attending: EMERGENCY MEDICINE
Payer: COMMERCIAL

## 2023-05-02 VITALS
SYSTOLIC BLOOD PRESSURE: 134 MMHG | HEART RATE: 98 BPM | RESPIRATION RATE: 18 BRPM | DIASTOLIC BLOOD PRESSURE: 77 MMHG | HEIGHT: 70 IN | WEIGHT: 180 LBS | TEMPERATURE: 99 F | OXYGEN SATURATION: 97 % | BODY MASS INDEX: 25.77 KG/M2

## 2023-05-02 DIAGNOSIS — J02.9 PHARYNGITIS, UNSPECIFIED ETIOLOGY: Primary | ICD-10-CM

## 2023-05-02 PROCEDURE — 99284 EMERGENCY DEPT VISIT MOD MDM: CPT

## 2023-05-02 PROCEDURE — 87651 STREP A DNA AMP PROBE: CPT

## 2023-05-02 PROCEDURE — 87502 INFLUENZA DNA AMP PROBE: CPT

## 2023-05-02 RX ORDER — PHENOL 1.4 %
AEROSOL, SPRAY (ML) MUCOUS MEMBRANE
Qty: 177 ML | Refills: 0 | Status: SHIPPED | OUTPATIENT
Start: 2023-05-02

## 2023-05-02 RX ORDER — BENZONATATE 200 MG/1
200 CAPSULE ORAL 3 TIMES DAILY PRN
Qty: 30 CAPSULE | Refills: 0 | Status: SHIPPED | OUTPATIENT
Start: 2023-05-02 | End: 2023-05-12

## 2023-05-02 RX ORDER — FLUTICASONE PROPIONATE 50 MCG
2 SPRAY, SUSPENSION (ML) NASAL DAILY
Qty: 15.8 ML | Refills: 0 | OUTPATIENT
Start: 2023-05-02 | End: 2023-05-07

## 2023-05-02 RX ORDER — IBUPROFEN 600 MG/1
600 TABLET ORAL EVERY 6 HOURS PRN
Qty: 20 TABLET | Refills: 0 | OUTPATIENT
Start: 2023-05-02 | End: 2023-05-07

## 2023-05-02 RX ORDER — CETIRIZINE HYDROCHLORIDE 10 MG/1
10 TABLET ORAL DAILY
Qty: 30 TABLET | Refills: 0 | Status: SHIPPED | OUTPATIENT
Start: 2023-05-02 | End: 2024-05-01

## 2023-05-02 RX ORDER — GUAIFENESIN/DEXTROMETHORPHAN 100-10MG/5
5 SYRUP ORAL EVERY 6 HOURS PRN
Qty: 473 ML | Refills: 0 | Status: SHIPPED | OUTPATIENT
Start: 2023-05-02 | End: 2023-05-12

## 2023-05-02 RX ORDER — ACETAMINOPHEN 500 MG
1000 TABLET ORAL EVERY 6 HOURS PRN
Qty: 56 TABLET | Refills: 0 | Status: SHIPPED | OUTPATIENT
Start: 2023-05-02 | End: 2023-05-09

## 2023-05-02 NOTE — ED PROVIDER NOTES
"Encounter Date: 5/2/2023    SCRIBE #1 NOTE: I, Taurus Ty, am scribing for, and in the presence of,  John Simpson PA-C. I have scribed the following portions of the note - Other sections scribed: HPI, ROS, PE.     History     Chief Complaint   Patient presents with    Sore Throat     Pt to ER with c/o sore throat and bilateral ear discomfort since this morning      Bladimir Calderon is a 27 y.o. male who presents to the ED for evaluation of a sore throat onset yesterday. Patient also c/o bilateral ear pain and a HA which onset today. Patient states it is painful to swallow and his throat feels "dry." Reports "feeling warm." He has been able to eat and drink at baseline. Denies CP, SOB, NVD, chills, rhinorrhea and cough.  No known sick contacts.    The history is provided by the patient. No  was used.   Review of patient's allergies indicates:  No Known Allergies  Past Medical History:   Diagnosis Date    HIV (human immunodeficiency virus infection)      History reviewed. No pertinent surgical history.  Family History   Problem Relation Age of Onset    Cancer Maternal Grandfather         LUNG    Heart failure Maternal Grandfather     Diabetes Mother     Diabetes Father     Diabetes Maternal Grandmother     Hypertension Maternal Grandmother     Diabetes Paternal Grandmother     Asthma Brother      Social History     Tobacco Use    Smoking status: Never    Smokeless tobacco: Never   Substance Use Topics    Alcohol use: No     Comment: OCCAS.    Drug use: No     Review of Systems   Constitutional:  Negative for chills, diaphoresis, fatigue and unexpected weight change.   HENT:  Positive for ear pain and sore throat. Negative for rhinorrhea and sinus pain.    Eyes:  Negative for pain, redness and visual disturbance.   Respiratory:  Negative for cough, chest tightness, shortness of breath and wheezing.    Cardiovascular:  Negative for chest pain and palpitations.   Gastrointestinal:  Negative for " abdominal pain, blood in stool, diarrhea, nausea and vomiting.   Endocrine: Negative for polydipsia, polyphagia and polyuria.   Genitourinary:  Negative for dysuria, frequency and urgency.   Musculoskeletal:  Negative for arthralgias, back pain and myalgias.   Skin:  Negative for rash.   Allergic/Immunologic: Negative for environmental allergies.   Neurological:  Positive for headaches. Negative for dizziness and syncope.     Physical Exam     Initial Vitals [05/02/23 1625]   BP Pulse Resp Temp SpO2   134/77 98 18 98.8 °F (37.1 °C) 97 %      MAP       --         Physical Exam    Nursing note and vitals reviewed.  Constitutional: Vital signs are normal. He appears well-developed and well-nourished. He is cooperative. He does not appear ill. No distress.   HENT:   Head: Normocephalic and atraumatic.   Right Ear: Hearing, tympanic membrane, external ear and ear canal normal. Tympanic membrane is not injected, not erythematous and not bulging. No middle ear effusion.   Left Ear: Hearing, tympanic membrane, external ear and ear canal normal. Tympanic membrane is not injected, not erythematous and not bulging.  No middle ear effusion.   Nose: Nose normal. No epistaxis. Right sinus exhibits no maxillary sinus tenderness and no frontal sinus tenderness. Left sinus exhibits no maxillary sinus tenderness and no frontal sinus tenderness.   Mouth/Throat: Mucous membranes are normal. Mucous membranes are not dry. Posterior oropharyngeal edema and posterior oropharyngeal erythema present. No oropharyngeal exudate or tonsillar abscesses.   Airway is patent with no stridor. Bilateral tonsil swelling and erythematous. Managing secretions appropriately.   Eyes: Conjunctivae and EOM are normal. Pupils are equal, round, and reactive to light. Right conjunctiva is not injected. Left conjunctiva is not injected.   Neck: Phonation normal. Neck supple.   Normal range of motion.   Full passive range of motion without pain.      Cardiovascular:  Normal rate, regular rhythm, S1 normal, S2 normal and normal heart sounds.  No extrasystoles are present.          No murmur heard.  Pulmonary/Chest: Effort normal and breath sounds normal. No accessory muscle usage. No tachypnea. No respiratory distress. He has no decreased breath sounds. He has no wheezes. He has no rhonchi. He has no rales.   Abdominal: Abdomen is soft and flat. Bowel sounds are normal. He exhibits no distension. There is no abdominal tenderness.   No right CVA tenderness.  No left CVA tenderness. There is no rebound and no guarding.   Musculoskeletal:      Cervical back: Full passive range of motion without pain, normal range of motion and neck supple. No rigidity. Normal range of motion.     Neurological: He is alert and oriented to person, place, and time. GCS eye subscore is 4. GCS verbal subscore is 5. GCS motor subscore is 6.   Skin: Skin is warm and dry. Capillary refill takes less than 2 seconds. No rash noted.       ED Course   Procedures  Labs Reviewed   POCT INFLUENZA A/B MOLECULAR   SARS-COV-2 RDRP GENE   POCT STREP A MOLECULAR          Imaging Results    None          Medications - No data to display  Medical Decision Making:   History:   Old Medical Records: I decided to obtain old medical records.  Initial Assessment:   27-year-old male presenting to the emergency department with a chief complaint of sore throat.  Recent onset of ear discomfort.  No known sick contacts.  On physical exam, patient is clinically well-appearing and in no acute distress.  Vital signs are all within normal limits.  Posterior oropharynx and tonsils are swollen and erythematous but have no exudates.  Differential Diagnosis:   Differential diagnosis includes but is not limited to respiratory infections including viral pharyngitis, strep pharyngitis, infectious mononucleosis, COVID, flu, bronchitis, rhinosinusitis, or pneumonia, or noninfectious processes such as asthma, COPD or seasonal  allergies.   Clinical Tests:   Lab Tests: Ordered and Reviewed  ED Management:  Patient presenting to the emergency department with chief complaint of sore throat.  History and physical exam findings as above.  Patient tested negative for strep, COVID, and flu.  Presentation is consistent with viral pharyngitis.  Considered but doubt strep pharyngitis, peritonsillar abscess, retropharyngeal abscess, other infectious process that would require further emergent workup or intervention at this time.  Will treat conservatively with symptomatic medications.  Throat lozenges, throat spray, Flonase, Zyrtec, Motrin, and Tylenol were electronically prescribed and sent patient's preferred pharmacy.    Return precautions were discussed, all patient questions were answered, and the patient was agreeable to the plan of care.  He was discharged home in stable condition and will follow up with his primary care provider or return to the emergency department if his symptoms worsen or do not improve.         Scribe Attestation:   Scribe #1: I performed the above scribed service and the documentation accurately describes the services I performed. I attest to the accuracy of the note.            I, John Simpson PA-C, personally performed the services described in this documentation.  All medical record entries made by the scribe were at my direction and in my presence.  I have reviewed the chart and agree that the record reflects my personal performance and is accurate and complete.        Clinical Impression:   Final diagnoses:  [J02.9] Pharyngitis, unspecified etiology (Primary)        ED Disposition Condition    Discharge Stable          ED Prescriptions       Medication Sig Dispense Start Date End Date Auth. Provider    fluticasone propionate (FLONASE) 50 mcg/actuation nasal spray 2 sprays (100 mcg total) by Each Nostril route once daily. 15.8 mL 5/2/2023 6/1/2023 John Simpson PA-C    cetirizine (ZYRTEC) 10 MG tablet Take 1  tablet (10 mg total) by mouth once daily. 30 tablet 5/2/2023 5/1/2024 John Simpson PA-C    benzocaine-menthoL 6-10 mg lozenge Take 1 lozenge by mouth every 2 (two) hours as needed. 36 tablet 5/2/2023 -- John Simpson PA-C    dextromethorphan-guaiFENesin  mg/5 ml (ROBITUSSIN-DM)  mg/5 mL liquid Take 5 mLs by mouth every 6 (six) hours as needed (cough). 473 mL 5/2/2023 5/12/2023 John Simpson PA-C    benzonatate (TESSALON) 200 MG capsule Take 1 capsule (200 mg total) by mouth 3 (three) times daily as needed for Cough. 30 capsule 5/2/2023 5/12/2023 John Simpson PA-C    ibuprofen (ADVIL,MOTRIN) 600 MG tablet Take 1 tablet (600 mg total) by mouth every 6 (six) hours as needed for Pain. 20 tablet 5/2/2023 -- John Simpson PA-C    acetaminophen (TYLENOL) 500 MG tablet Take 2 tablets (1,000 mg total) by mouth every 6 (six) hours as needed for Pain. 56 tablet 5/2/2023 5/9/2023 John Simpson PA-C    phenoL (CHLORASEPTIC THROAT SPRAY) 1.4 % SprA by Mucous Membrane route every 2 (two) hours as needed (Sore throat). 177 mL 5/2/2023 -- John Simpson PA-C          Follow-up Information       Follow up With Specialties Details Why Contact Info    Sammi Carmona MD Pediatrics Schedule an appointment as soon as possible for a visit  As needed, If symptoms worsen [address]  [address]  [facility]  [facility] [address]  [phone]               John Simpson PA-C  05/02/23 2016

## 2023-05-02 NOTE — ED TRIAGE NOTES
Pt to ER with c/o sore throat and bilateral ear discomfort since this morning.   Pt denies any other symptoms.   Pt AAOX4

## 2023-05-02 NOTE — DISCHARGE INSTRUCTIONS

## 2023-05-07 ENCOUNTER — HOSPITAL ENCOUNTER (EMERGENCY)
Facility: HOSPITAL | Age: 28
Discharge: HOME | End: 2023-05-07
Attending: INTERNAL MEDICINE
Payer: COMMERCIAL

## 2023-05-07 VITALS
WEIGHT: 210 LBS | DIASTOLIC BLOOD PRESSURE: 70 MMHG | HEIGHT: 70 IN | TEMPERATURE: 100 F | OXYGEN SATURATION: 98 % | HEART RATE: 98 BPM | BODY MASS INDEX: 30.06 KG/M2 | SYSTOLIC BLOOD PRESSURE: 121 MMHG | RESPIRATION RATE: 20 BRPM

## 2023-05-07 DIAGNOSIS — J06.9 VIRAL URI WITH COUGH: Primary | ICD-10-CM

## 2023-05-07 PROCEDURE — 87651 STREP A DNA AMP PROBE: CPT

## 2023-05-07 PROCEDURE — 99284 EMERGENCY DEPT VISIT MOD MDM: CPT

## 2023-05-07 PROCEDURE — 25000003 PHARM REV CODE 250: Performed by: INTERNAL MEDICINE

## 2023-05-07 PROCEDURE — 87502 INFLUENZA DNA AMP PROBE: CPT

## 2023-05-07 RX ORDER — IBUPROFEN 800 MG/1
800 TABLET ORAL EVERY 8 HOURS PRN
Qty: 30 TABLET | Refills: 0 | Status: SHIPPED | OUTPATIENT
Start: 2023-05-07

## 2023-05-07 RX ORDER — FLUTICASONE PROPIONATE 50 MCG
2 SPRAY, SUSPENSION (ML) NASAL DAILY
Qty: 15 G | Refills: 0 | Status: SHIPPED | OUTPATIENT
Start: 2023-05-07

## 2023-05-07 RX ORDER — AZELASTINE 1 MG/ML
2 SPRAY, METERED NASAL 2 TIMES DAILY
Qty: 30 ML | Refills: 0 | Status: SHIPPED | OUTPATIENT
Start: 2023-05-07 | End: 2023-07-01

## 2023-05-07 RX ORDER — IBUPROFEN 400 MG/1
800 TABLET ORAL
Status: COMPLETED | OUTPATIENT
Start: 2023-05-07 | End: 2023-05-07

## 2023-05-07 RX ORDER — ACETAMINOPHEN 500 MG
1000 TABLET ORAL
Status: COMPLETED | OUTPATIENT
Start: 2023-05-07 | End: 2023-05-07

## 2023-05-07 RX ADMIN — ACETAMINOPHEN 1000 MG: 500 TABLET ORAL at 08:05

## 2023-05-07 RX ADMIN — IBUPROFEN 800 MG: 400 TABLET ORAL at 08:05

## 2023-05-08 NOTE — ED PROVIDER NOTES
Encounter Date: 5/7/2023    SCRIBE #1 NOTE: I, Sonam Byrd, am scribing for, and in the presence of,  Dr. Shady Newby. I have scribed the following portions of the note - Other sections scribed: HPI/ROS/PE.     History     Chief Complaint   Patient presents with    Cough     C/o cough, HA and sore throat for 5 days. Pt was seen on 5/2/2023 and diagnosed with  pharyngitis. Pt states symptoms are continues  but have not worsened.      Bladimir Calderon is a 27 y.o. male, with a PMHx of HIV, who presents to the ED with productive cough onset 5 days. Pt also reports headache, sore throat and tinnitus. Pt seen in ED on 5/2/2023 and diagnosed with pharyngitis. His symptoms have worsened since his visit. No other exacerbating or alleviating factors. Patient denies fever, chills, weakness, chest pain, shortness of breath, or other associated symptoms. This is the extent of the patient's complaints today in the Emergency Department.      The history is provided by the patient.   Review of patient's allergies indicates:  No Known Allergies  Past Medical History:   Diagnosis Date    HIV (human immunodeficiency virus infection)      History reviewed. No pertinent surgical history.  Family History   Problem Relation Age of Onset    Cancer Maternal Grandfather         LUNG    Heart failure Maternal Grandfather     Diabetes Mother     Diabetes Father     Diabetes Maternal Grandmother     Hypertension Maternal Grandmother     Diabetes Paternal Grandmother     Asthma Brother      Social History     Tobacco Use    Smoking status: Never    Smokeless tobacco: Never   Substance Use Topics    Alcohol use: No     Comment: OCCAS.    Drug use: No     Review of Systems   Constitutional:  Negative for chills and fever.   HENT:  Positive for sore throat and tinnitus.    Respiratory:  Positive for cough. Negative for shortness of breath.    Cardiovascular:  Negative for chest pain.   Gastrointestinal:  Negative for diarrhea, nausea and  vomiting.   Genitourinary:  Negative for dysuria.   Musculoskeletal:  Negative for back pain.   Skin:  Negative for rash.   Neurological:  Positive for headaches. Negative for weakness.   Psychiatric/Behavioral:  Negative for behavioral problems.    All other systems reviewed and are negative.    Physical Exam     Initial Vitals [05/07/23 1934]   BP Pulse Resp Temp SpO2   115/68 100 18 99.8 °F (37.7 °C) 98 %      MAP       --         Physical Exam    Nursing note and vitals reviewed.  Constitutional: He appears well-developed and well-nourished.   HENT:   Head: Normocephalic and atraumatic.   Enlarged nasal turbinates noted. Clear nasal discharge noted. Oropharyngeal erythema present. No oropharyngeal exudate or edema.       Eyes: Conjunctivae are normal.   Neck: Neck supple.   Normal range of motion.  Cardiovascular:  Normal rate, regular rhythm and normal heart sounds.     Exam reveals no gallop and no friction rub.       No murmur heard.  Pulmonary/Chest: Breath sounds normal. No respiratory distress. He has no wheezes. He has no rhonchi. He has no rales.   Abdominal: Abdomen is soft. There is no abdominal tenderness.   Musculoskeletal:         General: No edema. Normal range of motion.      Cervical back: Normal range of motion and neck supple.     Neurological: He is alert and oriented to person, place, and time. GCS score is 15. GCS eye subscore is 4. GCS verbal subscore is 5. GCS motor subscore is 6.   Skin: Skin is warm and dry.   Psychiatric: He has a normal mood and affect.       ED Course   Procedures  Labs Reviewed   POCT INFLUENZA A/B MOLECULAR   POCT STREP A MOLECULAR   SARS-COV-2 RDRP GENE          Imaging Results    None          Medications   ibuprofen tablet 800 mg (800 mg Oral Given 5/7/23 2046)   acetaminophen tablet 1,000 mg (1,000 mg Oral Given 5/7/23 2046)     Medical Decision Making:   History:   Old Medical Records: I decided to obtain old medical records.  Initial Assessment:   Bladimir BLANCA  Kvng is a 27 y.o. male, with a PMHx of HIV, who presents to the ED with productive cough onset 5 days.  Clinical Tests:   Lab Tests: Ordered and Reviewed  ED Management:  Rapid flu, rapid COVID and rapid strep were negative.  Patient was given instructions for viral syndrome and received prescriptions for Astelin/fluticasone/ibuprofen.  He was advised to follow-up with his primary care physician within the next week for re-evaluation/return to the emergency department if condition worsens.        Scribe Attestation:   Scribe #1: I performed the above scribed service and the documentation accurately describes the services I performed. I attest to the accuracy of the note.                   Clinical Impression:   Final diagnoses:  [J06.9] Viral URI with cough (Primary)         This document was produced by a scribe under my direction and in my presence. I agree with the content of the note and have made any necessary edits.     Dr. Newby    05/08/2023 3:24 AM     ED Disposition Condition    Discharge Stable          ED Prescriptions       Medication Sig Dispense Start Date End Date Auth. Provider    azelastine (ASTELIN) 137 mcg (0.1 %) nasal spray 2 sprays (274 mcg total) by Nasal route 2 (two) times daily. 30 mL 5/7/2023 7/1/2023 Shady Newby MD    fluticasone propionate (FLONASE) 50 mcg/actuation nasal spray 2 sprays (100 mcg total) by Each Nostril route once daily. 15 g 5/7/2023 -- Shady Newby MD    ibuprofen (ADVIL,MOTRIN) 800 MG tablet Take 1 tablet (800 mg total) by mouth every 8 (eight) hours as needed for Pain. 30 tablet 5/7/2023 -- Shady Newby MD          Follow-up Information       Follow up With Specialties Details Why Contact Info    Sammi Carmona MD Pediatrics Schedule an appointment as soon as possible for a visit in 1 week For reevaluation [address]  [address]  [facility]  [facility] [address]  [phone]               Shady Newby MD  05/08/23 0324

## 2023-05-08 NOTE — ED TRIAGE NOTES
Pt presents to ED c/o cough, HA, productive cough producing yellow and clear phlegm and ringing to ears that started on Tuesday. Pt reports that he was seen here Tuesday for the same symptoms but symptoms are still present and getting worse. Pt denies fever, chills, weakness, chest pain, SOB. Pt is AAOx4.

## 2023-07-19 ENCOUNTER — PATIENT MESSAGE (OUTPATIENT)
Dept: RESEARCH | Facility: HOSPITAL | Age: 28
End: 2023-07-19
Payer: COMMERCIAL

## 2023-11-01 ENCOUNTER — HOSPITAL ENCOUNTER (EMERGENCY)
Facility: HOSPITAL | Age: 28
Discharge: HOME | End: 2023-11-01
Attending: EMERGENCY MEDICINE
Payer: COMMERCIAL

## 2023-11-01 VITALS
DIASTOLIC BLOOD PRESSURE: 74 MMHG | HEIGHT: 70 IN | RESPIRATION RATE: 16 BRPM | SYSTOLIC BLOOD PRESSURE: 132 MMHG | TEMPERATURE: 98 F | HEART RATE: 88 BPM | WEIGHT: 220 LBS | OXYGEN SATURATION: 96 % | BODY MASS INDEX: 31.5 KG/M2

## 2023-11-01 DIAGNOSIS — M62.830 BACK SPASM: Primary | ICD-10-CM

## 2023-11-01 PROCEDURE — 63600175 PHARM REV CODE 636 W HCPCS: Performed by: NURSE PRACTITIONER

## 2023-11-01 PROCEDURE — 96372 THER/PROPH/DIAG INJ SC/IM: CPT | Performed by: NURSE PRACTITIONER

## 2023-11-01 PROCEDURE — 99284 EMERGENCY DEPT VISIT MOD MDM: CPT

## 2023-11-01 RX ORDER — CYCLOBENZAPRINE HCL 10 MG
10 TABLET ORAL 3 TIMES DAILY PRN
Qty: 15 TABLET | Refills: 0 | Status: SHIPPED | OUTPATIENT
Start: 2023-11-01 | End: 2023-11-06

## 2023-11-01 RX ORDER — KETOROLAC TROMETHAMINE 30 MG/ML
15 INJECTION, SOLUTION INTRAMUSCULAR; INTRAVENOUS
Status: COMPLETED | OUTPATIENT
Start: 2023-11-01 | End: 2023-11-01

## 2023-11-01 RX ORDER — METHYLPREDNISOLONE SOD SUCC 125 MG
125 VIAL (EA) INJECTION
Status: COMPLETED | OUTPATIENT
Start: 2023-11-01 | End: 2023-11-01

## 2023-11-01 RX ORDER — ORPHENADRINE CITRATE 30 MG/ML
60 INJECTION INTRAMUSCULAR; INTRAVENOUS
Status: COMPLETED | OUTPATIENT
Start: 2023-11-01 | End: 2023-11-01

## 2023-11-01 RX ORDER — SULINDAC 150 MG/1
150 TABLET ORAL 2 TIMES DAILY
Qty: 10 TABLET | Refills: 0 | Status: SHIPPED | OUTPATIENT
Start: 2023-11-01 | End: 2023-11-06

## 2023-11-01 RX ADMIN — KETOROLAC TROMETHAMINE 15 MG: 30 INJECTION, SOLUTION INTRAMUSCULAR; INTRAVENOUS at 08:11

## 2023-11-01 RX ADMIN — METHYLPREDNISOLONE SODIUM SUCCINATE 125 MG: 125 INJECTION, POWDER, FOR SOLUTION INTRAMUSCULAR; INTRAVENOUS at 08:11

## 2023-11-01 RX ADMIN — ORPHENADRINE CITRATE 60 MG: 60 INJECTION INTRAMUSCULAR; INTRAVENOUS at 08:11

## 2023-11-01 NOTE — Clinical Note
"Bladimir Lemus" Kvng was seen and treated in our emergency department on 11/1/2023.  He may return to work on 11/03/2023.       If you have any questions or concerns, please don't hesitate to call.      LONG handy RN    "

## 2023-11-02 NOTE — DISCHARGE INSTRUCTIONS
You have been prescribed clinoril (sulindac), an anti-inflammatory.  Take this medication whether you feel you need it or not.  Do not take ibuprofen, naproxen or other NSAID's medications while taking this medication. You have also been prescribed flexeril (cyclobenzaprine).  You have been given a medication that causes drowsiness.  Do not operate motor vehicles, drink alcohol, or operate heavy machinery while taking this medication. Return to the Emergency Department for any worsening, change in condition, or any emergent concerns.  Do not take prescribed medications for at least 8h after medications given in the Emergency Department.  Return to the Emergency Department for any worsening, change in condition, or any emergent concerns.

## 2023-11-02 NOTE — FIRST PROVIDER EVALUATION
"Medical screening examination initiated.  I have conducted a focused provider triage encounter, findings are as follows:    Brief history of present illness:  29 y/o male PMH of HIV with onset of low back pain x 2 days. Denies fever, CP, SOB, saddle anesthesia, urinary retention, bowel incontinence.     Vitals:    11/01/23 1928   BP: 119/66   BP Location: Left arm   Patient Position: Sitting   Pulse: 89   Resp: 16   Temp: 98 °F (36.7 °C)   TempSrc: Oral   SpO2: 95%   Weight: 99.8 kg (220 lb)   Height: 5' 10" (1.778 m)       Pertinent physical exam:  TTP lumbar spine    Brief workup plan:  with history of HIV and back pain DDX spinal/epidural abscess/discitis vs MSK strain in absence of fever or neuro deficit. Up to next provider if warranted further workup with ESR/CRP, CBC or CT imaging.    Preliminary workup initiated; this workup will be continued and followed by the physician or advanced practice provider that is assigned to the patient when roomed.  "

## 2023-11-02 NOTE — ED NOTES
Bilateral lower back pain x1 day. Pt reports lower back pain that began with an sudden onset yesterday. Pt denies injury or trauma. Pt denies dysuria or flank pain

## 2023-11-02 NOTE — ED PROVIDER NOTES
Encounter Date: 11/1/2023       History     Chief Complaint   Patient presents with    Back Pain     Pt presents to ED c/o lower back pain onset yesterday.  Pt reports painful to , last normal BM was today.  Denies falls, trauma, urinary symptoms or any other symptoms.  Pt reports taking IBU and muscle relaxer this morning with no relief.  Pain 10/10.  Hx of muscle spasms.       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  Pain is constant and feels like throbbing.  Denies any alleviating factors despite using ibuprofen and muscle relaxers.  He reports it is worse with walking standing or sitting.  Current severity pain is 10/10.  The patient's history is significant for HIV.    The history is provided by the patient. No  was used.     Review of patient's allergies indicates:  No Known Allergies  Past Medical History:   Diagnosis Date    HIV (human immunodeficiency virus infection)      History reviewed. No pertinent surgical history.  Family History   Problem Relation Age of Onset    Cancer Maternal Grandfather         LUNG    Heart failure Maternal Grandfather     Diabetes Mother     Diabetes Father     Diabetes Maternal Grandmother     Hypertension Maternal Grandmother     Diabetes Paternal Grandmother     Asthma Brother      Social History     Tobacco Use    Smoking status: Never    Smokeless tobacco: Never   Substance Use Topics    Alcohol use: No     Comment: OCCAS.    Drug use: No     Review of Systems   Constitutional:  Negative for appetite change, chills, diaphoresis, fatigue and fever.   HENT:  Negative for congestion, ear discharge, ear pain, postnasal drip, rhinorrhea, sinus pressure, sneezing, sore throat and voice change.    Eyes:   Negative for discharge, itching and visual disturbance.   Respiratory:  Negative for cough, shortness of breath and wheezing.    Cardiovascular:  Negative for chest pain, palpitations and leg swelling.   Gastrointestinal:  Negative for abdominal pain, nausea and vomiting.   Endocrine: Negative for polydipsia, polyphagia and polyuria.   Genitourinary:  Negative for difficulty urinating, dysuria, frequency, hematuria, penile discharge, penile pain, penile swelling and urgency.   Musculoskeletal:  Positive for back pain. Negative for arthralgias and myalgias.   Skin:  Negative for rash and wound.   Neurological:  Negative for dizziness, seizures, syncope and weakness.   Hematological:  Negative for adenopathy. Does not bruise/bleed easily.   Psychiatric/Behavioral:  Negative for agitation and self-injury. The patient is not nervous/anxious.        Physical Exam     Initial Vitals [11/01/23 1928]   BP Pulse Resp Temp SpO2   119/66 89 16 98 °F (36.7 °C) 95 %      MAP       --         Physical Exam    Nursing note and vitals reviewed.  Constitutional: He appears well-developed and well-nourished. He is not diaphoretic. No distress.   HENT:   Head: Normocephalic and atraumatic.   Right Ear: External ear normal.   Left Ear: External ear normal.   Nose: Nose normal.   Eyes: Pupils are equal, round, and reactive to light. Right eye exhibits no discharge. Left eye exhibits no discharge. No scleral icterus.   Neck:   Normal range of motion.  Pulmonary/Chest: No respiratory distress.   Abdominal: He exhibits no distension.   Musculoskeletal:         General: Normal range of motion.      Cervical back: Normal range of motion.      Comments: Spine is without tenderness or stepoffs.     Neurological: He is alert and oriented to person, place, and time. He has normal strength. No cranial nerve deficit or sensory deficit. He exhibits normal muscle tone. He displays a negative Romberg sign. Coordination and gait normal. GCS eye  subscore is 4. GCS verbal subscore is 5. GCS motor subscore is 6.   Equal  strength bilaterally, equal bicep flexion and tricep extension strength, leg extension and flexion strength appropriate and equal, foot plantar- and dorsi-flexion equal and appropriate   Skin: Skin is dry. Capillary refill takes less than 2 seconds.         ED Course   Procedures  Labs Reviewed - No data to display       Imaging Results              X-Ray Lumbar Spine Ap And Lateral (Final result)  Result time 11/01/23 21:31:06      Final result by Jens Flynn MD (11/01/23 21:31:06)                   Impression:      No acute lumbar spine abnormalities identified.      Electronically signed by: Jens Flynn MD  Date:    11/01/2023  Time:    21:31               Narrative:    EXAMINATION:  XR LUMBAR SPINE AP AND LATERAL    CLINICAL HISTORY:  back pain;    TECHNIQUE:  AP, lateral and spot images were performed of the lumbar spine.    COMPARISON:  None    FINDINGS:  Lumbar spine alignment is within normal limits.  No evidence of acute lumbar spine fracture or subluxation.  Intervertebral disc spaces appear fairly well maintained.  Visualized sacrum is unremarkable.                                       Medications   ketorolac injection 15 mg (15 mg Intramuscular Given 11/1/23 2047)   orphenadrine injection 60 mg (60 mg Intramuscular Given 11/1/23 2047)   methylPREDNISolone sodium succinate injection 125 mg (125 mg Intramuscular Given 11/1/23 2047)     Medical Decision Making  MDM  Initial Assessment  This is an evaluation of a 28 y.o. male that presents to the Emergency Department for back pain.  Denies fever, rash, night sweats, weight loss, dysuria, bowel/bladder incontinence, immunosuppression or IV\SQ drug use. The patient is a non-toxic, afebrile, and well appearing male. On physical exam, there is no tenderness or stepoffs of the spine. There CVA tenderness, saddle anesthesia, rash, erythema, or open wounds. Strength and  "sensation are symmetric bilaterally. Vital signs reassuring.     Diagnoses  Given the above findings, my overall impression is back strain or spasm. Given the above findings, I do not think the patient has acute vertebral fracture, subluxation, dislocation, sciatica, epidural abscess, cauda equina, shingles, UTI, pyelonephritis.    Plan  Medications listed below were prescribed after reviewing the patient's allergies, medication list, history, most recent laboratories as available.  Referrals below were provided after reviewing the patient's previous medical providers.     See AVS for additional recommendations. Medications listed herein were prescribed after reviewing the patient's allergies, medication list, history, most recent laboratories as available.  Referrals below were provided after reviewing the patient's previous medical providers. He understands he  should return for any worsening or changes in condition.  Prior to discharge the patient was asked if he  had any additional concerns or complaints and he declined. The patient was given an opportunity to ask questions and all were answered to his satisfaction.      Problems Addressed:  Back spasm: acute illness or injury     Details: Given Toradol and Flexeril in the ED patient reported improvement.  Discharged on Clinoril and Flexeril to follow up as directed.    Amount and/or Complexity of Data Reviewed  Radiology: ordered. Decision-making details documented in ED Course.    Risk  Prescription drug management.  Diagnosis or treatment significantly limited by social determinants of health.  Risk Details: Vital signs at the time of disposition were:  /74 (BP Location: Right arm, Patient Position: Sitting)   Pulse 88   Temp 98 °F (36.7 °C) (Oral)   Resp 16   Ht 5' 10" (1.778 m)   Wt 99.8 kg (220 lb)   SpO2 96%   BMI 31.57 kg/m²       See AVS for additional recommendations. Medications listed herein were prescribed after reviewing the patient's " allergies, medication list, history, most recent laboratories as available.  Referrals below were provided after reviewing the patient's previous medical providers. He understands he  should return for any worsening or changes in condition.  Prior to discharge the patient was asked if he  had any additional concerns or complaints and he declined. The patient was given an opportunity to ask questions and all were answered to his satisfaction.                ED Course as of 11/01/23 2331 Wed Nov 01, 2023 2022 BP: 119/66 [VC]   2022 Temp: 98 °F (36.7 °C) [VC]   2022 Temp Source: Oral [VC]   2022 Resp: 16 [VC]   2022 Pulse: 89 [VC]   2022 SpO2: 95 % [VC]   2122 BP: 132/74 [VC]   2122 Temp: 98 °F (36.7 °C) [VC]   2122 Temp Source: Oral [VC]   2122 Pulse: 88 [VC]   2122 Resp: 16 [VC]   2122 SpO2: 96 % [VC]   2149 X-Ray Lumbar Spine Ap And Lateral     No acute lumbar spine abnormalities identified.    [VC]      ED Course User Index  [VC] Naren Jay DNP                    Clinical Impression:   Final diagnoses:  [M62.830] Back spasm (Primary)        ED Disposition Condition    Discharge Stable          ED Prescriptions       Medication Sig Dispense Start Date End Date Auth. Provider    sulindac (CLINORIL) 150 MG tablet Take 1 tablet (150 mg total) by mouth 2 (two) times daily. for 5 days 10 tablet 11/1/2023 11/6/2023 Naren Jay DNP    cyclobenzaprine (FLEXERIL) 10 MG tablet Take 1 tablet (10 mg total) by mouth 3 (three) times daily as needed for Muscle spasms. 15 tablet 11/1/2023 11/6/2023 Naren Jay DNP          Follow-up Information       Follow up With Specialties Details Why Contact Info    Sammi Carmona MD Pediatrics Schedule an appointment as soon as possible for a visit   [address] [facility]  [address]  [facility]  [facility] [address]  [phone]               Naren Jay DNP  11/01/23 2332

## 2023-12-04 ENCOUNTER — HOSPITAL ENCOUNTER (EMERGENCY)
Facility: HOSPITAL | Age: 28
Discharge: HOME | End: 2023-12-04
Attending: EMERGENCY MEDICINE
Payer: COMMERCIAL

## 2023-12-04 VITALS
HEART RATE: 92 BPM | SYSTOLIC BLOOD PRESSURE: 132 MMHG | DIASTOLIC BLOOD PRESSURE: 79 MMHG | OXYGEN SATURATION: 99 % | BODY MASS INDEX: 31.21 KG/M2 | TEMPERATURE: 98 F | RESPIRATION RATE: 20 BRPM | WEIGHT: 218 LBS | HEIGHT: 70 IN

## 2023-12-04 DIAGNOSIS — R09.89 CHEST CONGESTION: ICD-10-CM

## 2023-12-04 DIAGNOSIS — J20.9 ACUTE BRONCHITIS, UNSPECIFIED ORGANISM: Primary | ICD-10-CM

## 2023-12-04 DIAGNOSIS — R05.9 COUGH: ICD-10-CM

## 2023-12-04 PROCEDURE — 87651 STREP A DNA AMP PROBE: CPT

## 2023-12-04 PROCEDURE — 87502 INFLUENZA DNA AMP PROBE: CPT

## 2023-12-04 PROCEDURE — 93010 EKG 12-LEAD: ICD-10-PCS | Mod: ,,, | Performed by: INTERNAL MEDICINE

## 2023-12-04 PROCEDURE — 99284 EMERGENCY DEPT VISIT MOD MDM: CPT | Mod: 25

## 2023-12-04 PROCEDURE — 93005 ELECTROCARDIOGRAM TRACING: CPT

## 2023-12-04 PROCEDURE — 93010 ELECTROCARDIOGRAM REPORT: CPT | Mod: ,,, | Performed by: INTERNAL MEDICINE

## 2023-12-04 PROCEDURE — 87635 SARS-COV-2 COVID-19 AMP PRB: CPT | Performed by: NURSE PRACTITIONER

## 2023-12-04 RX ORDER — ACETAMINOPHEN 500 MG
500 TABLET ORAL EVERY 6 HOURS PRN
Qty: 30 TABLET | Refills: 0 | Status: SHIPPED | OUTPATIENT
Start: 2023-12-04

## 2023-12-04 RX ORDER — CETIRIZINE HYDROCHLORIDE 10 MG/1
10 TABLET ORAL DAILY
Qty: 30 TABLET | Refills: 0 | Status: SHIPPED | OUTPATIENT
Start: 2023-12-04 | End: 2024-01-03

## 2023-12-04 RX ORDER — GUAIFENESIN/DEXTROMETHORPHAN 100-10MG/5
5 SYRUP ORAL EVERY 6 HOURS
Qty: 473 ML | Refills: 0 | Status: SHIPPED | OUTPATIENT
Start: 2023-12-04 | End: 2023-12-14

## 2023-12-04 RX ORDER — DOXYCYCLINE 100 MG/1
100 CAPSULE ORAL 2 TIMES DAILY
Qty: 14 CAPSULE | Refills: 0 | Status: SHIPPED | OUTPATIENT
Start: 2023-12-04 | End: 2023-12-11

## 2023-12-04 RX ORDER — IBUPROFEN 600 MG/1
600 TABLET ORAL EVERY 6 HOURS PRN
Qty: 20 TABLET | Refills: 0 | Status: SHIPPED | OUTPATIENT
Start: 2023-12-04

## 2023-12-04 RX ORDER — PHENOL 1.4 %
AEROSOL, SPRAY (ML) MUCOUS MEMBRANE
Qty: 177 ML | Refills: 0 | Status: SHIPPED | OUTPATIENT
Start: 2023-12-04

## 2023-12-04 NOTE — Clinical Note
"Bladimir Valles (Noah)charisse was seen and treated in our emergency department on 12/4/2023.  He may return to work on 12/06/2023.       If you have any questions or concerns, please don't hesitate to call.       RN    "

## 2023-12-05 NOTE — ED PROVIDER NOTES
Encounter Date: 12/4/2023    SCRIBE #1 NOTE: I, Muna Ramsay, am scribing for, and in the presence of,  Joesph Baker PA-C. I have scribed the following portions of the note - Other sections scribed: HPI, ROS.       History     Chief Complaint   Patient presents with    Cough     Patient reports cough, congestion body aches and chest pain for 3 weeks     Bladimir Calderon is a 28 y.o. male with Hx of HIV who presents to the ED complaining of a worsening productive cough for 3 weeks. Pt notes cough is accompanied with green and white sputum and causes left sided chest pain/discomfort. Pt notes pain is exacerbated with cough and stretching/raising left arm. Pt also reports associated myalgias, fatigue, SOB (resolved), and post tussive emesis. Pt medicated with Robitussin, Mucinex, Delsym, and Tessalon with no relief. Pt denies any nausea or back pain. Pt has no other concerns at this time.      The history is provided by the patient. No  was used.     Review of patient's allergies indicates:  No Known Allergies  Past Medical History:   Diagnosis Date    HIV (human immunodeficiency virus infection)      History reviewed. No pertinent surgical history.  Family History   Problem Relation Age of Onset    Cancer Maternal Grandfather         LUNG    Heart failure Maternal Grandfather     Diabetes Mother     Diabetes Father     Diabetes Maternal Grandmother     Hypertension Maternal Grandmother     Diabetes Paternal Grandmother     Asthma Brother      Social History     Tobacco Use    Smoking status: Never    Smokeless tobacco: Never   Substance Use Topics    Alcohol use: No     Comment: OCCAS.    Drug use: No     Review of Systems   Constitutional:  Positive for fatigue. Negative for chills and fever.   HENT:  Negative for ear pain and sore throat.    Eyes:  Negative for redness.   Respiratory:  Positive for cough. Negative for shortness of breath.    Cardiovascular:  Positive for chest pain. Negative  for leg swelling.   Gastrointestinal:  Positive for vomiting. Negative for abdominal pain, constipation, diarrhea and nausea.   Genitourinary:  Negative for dysuria.   Musculoskeletal:  Positive for myalgias. Negative for back pain.   Skin:  Negative for rash.   Neurological:  Negative for headaches.   All other systems reviewed and are negative.      Physical Exam     Initial Vitals [12/04/23 1805]   BP Pulse Resp Temp SpO2   132/79 110 20 98.1 °F (36.7 °C) 99 %      MAP       --         Physical Exam    Nursing note and vitals reviewed.  Constitutional: He appears well-developed and well-nourished. He is not diaphoretic. He does not appear ill. No distress.   HENT:   Head: Normocephalic and atraumatic. Head is without raccoon's eyes and without Thornton's sign.   Right Ear: Tympanic membrane, external ear and ear canal normal.   Left Ear: Tympanic membrane, external ear and ear canal normal.   Nose: Mucosal edema present. No rhinorrhea.   Mouth/Throat: Uvula is midline, oropharynx is clear and moist and mucous membranes are normal. No posterior oropharyngeal erythema.   Postnasal drip noted   Eyes: Conjunctivae, EOM and lids are normal. Pupils are equal, round, and reactive to light. Right eye exhibits no discharge. Left eye exhibits no discharge. No scleral icterus.   Neck: Trachea normal. No JVD present.   Normal range of motion.   Full passive range of motion without pain.     Cardiovascular:  Normal rate, regular rhythm, normal heart sounds and normal pulses.     Exam reveals no distant heart sounds and no friction rub.       Pulmonary/Chest: Effort normal and breath sounds normal. No respiratory distress.   Abdominal: Abdomen is soft. Bowel sounds are normal. He exhibits no distension and no pulsatile midline mass. There is no abdominal tenderness.   No right CVA tenderness.  No left CVA tenderness. There is no rebound and no guarding.   Musculoskeletal:         General: Normal range of motion.      Right  shoulder: Normal.      Left shoulder: Normal.      Right elbow: Normal.      Left elbow: Normal.      Right wrist: Normal.      Left wrist: Normal.      Right hand: Normal.      Left hand: Normal.      Cervical back: Normal, full passive range of motion without pain and normal range of motion.      Thoracic back: Normal.      Lumbar back: Normal.      Right hip: Normal.      Left hip: Normal.      Right knee: Normal.      Left knee: Normal.      Right lower leg: Normal.      Left lower leg: Normal.      Right ankle: Normal.      Left ankle: Normal.      Right foot: Normal.      Left foot: Normal.     Lymphadenopathy:        Head (right side): No submental, no submandibular, no tonsillar, no preauricular, no posterior auricular and no occipital adenopathy present.        Head (left side): No submental, no submandibular, no tonsillar, no preauricular, no posterior auricular and no occipital adenopathy present.     He has no cervical adenopathy.   Neurological: He is alert and oriented to person, place, and time. He has normal strength. No cranial nerve deficit or sensory deficit. Gait normal.   Skin: Skin is warm and dry. Capillary refill takes less than 2 seconds. No bruising, no ecchymosis and no rash noted. No erythema.   Psychiatric: He has a normal mood and affect. His speech is normal and behavior is normal. Thought content normal.         ED Course   Procedures  Labs Reviewed   SARS-COV-2 RDRP GENE   POCT STREP A MOLECULAR   POCT INFLUENZA A/B MOLECULAR          Imaging Results              X-Ray Chest PA And Lateral (Final result)  Result time 12/04/23 19:13:01      Final result by Jens Flynn MD (12/04/23 19:13:01)                   Impression:      No acute cardiopulmonary process identified.      Electronically signed by: Jens Flynn MD  Date:    12/04/2023  Time:    19:13               Narrative:    EXAMINATION:  XR CHEST PA AND LATERAL    CLINICAL HISTORY:  Cough, unspecified    TECHNIQUE:  PA  and lateral views of the chest were performed.    COMPARISON:  March 2018.    FINDINGS:  Cardiac silhouette is normal in size.  Lungs are symmetrically expanded.  No evidence of focal consolidative process, pneumothorax, or significant pleural effusion.  No acute osseous abnormality identified.                                    X-Rays:   Independently Interpreted Readings:   Chest X-Ray: Normal heart size.  No infiltrates.  No acute abnormalities. No infiltrate. No effusion. No pneumothorax. Heart size normal.        Medications - No data to display  Medical Decision Making  Bladimir Calderon is a 28 y.o. male with Hx of HIV who presents to the ED complaining of a worsening productive cough for 3 weeks. Pt notes cough is accompanied with green and white sputum and causes left sided chest pain/discomfort. Pt notes pain is exacerbated with cough and stretching/raising left arm. Pt also reports associated myalgias, fatigue, SOB (resolved), and post tussive emesis. Pt medicated with Robitussin, Mucinex, Delsym, and Tessalon with no relief. Pt denies any nausea or back pain. Pt has no other concerns at this time.  Patient's chart and medical history reviewed.  Patient's vitals reviewed.  They are afebrile, no respiratory distress, nontoxic-appearing in the ED.  COVID  Flu  Strep throat  Viral URI  Acute bronchitis:  Considered with presence induration of cough with mucus production.  - costochondritis:  Considered with consistent prolonged cough resulting in chest discomfort exacerbated and correlated with cough and with movement of left shoulder and/or stretching left pectoralis muscle.  Reproducible pain with stretching and palpation.  No overlying skin changes.  - Dissection/AAA: unlikely due to normal v/s, equal pulses UE/LE bilat., no abdominal pulsing mass  - Tamponade: unlikely with no blunt chest trauma, normal heart sounds, no hypotension, no JVD  - PE:  Considered with episode of tachycardia although reduction in  heart rate after patient sitting down and relaxed.  Consider acute tachycardia due to patient's viral illness.  No history of recent travel, surgery, hospitalization, stagnation, injury/trauma, hypercoagulability, hormone replacement, extremity swelling/pain.  Negative Homans sign on exam bilaterally.  Normal chest x-ray.   - ACS: unlikely with no RF, no cardiac history,12 lead is normal, and CXR is unremarkable   - Pneumonia/pneumothorax unlikely with clear lung sounds in all fields, no egophony, no hyperresonance, normal CXR.  - Arrhythmia: unlikely with normal ECG and normal electrolytes, no PMH of arrhythmias.     Physical exam findings, imaging, lab results discussed with the patient and plan is made to have him follow up with his primary care physician in the next 3-5 days for re-evaluation.  Patient agrees with this plan does not have any questions for me at this time.  Patient is currently hemodynamically stable, afebrile, tolerating p.o. intake, ambulatory without assistance.   I discussed with the patient/family the diagnosis, treatment plan, indications for return to the emergency department, and for expected follow-up. The patient/family verbalized an understanding. The patient/family is asked if there are any questions or concerns. We discuss the case, until all issues are addressed to the patient/family's satisfaction. Patient/family understands and is agreeable to the plan.   BETY ARTEAGA    DISCLAIMER: This note was prepared with [employer] voice recognition transcription software. Garbled syntax, mangled pronouns, and other bizarre constructions may be attributed to that software system.      Amount and/or Complexity of Data Reviewed  Labs: ordered. Decision-making details documented in ED Course.  Radiology: ordered. Decision-making details documented in ED Course.  ECG/medicine tests: ordered and independent interpretation performed. Decision-making details documented in ED Course.    Risk  OTC  drugs.  Prescription drug management.  Diagnosis or treatment significantly limited by social determinants of health.            Scribe Attestation:   Scribe #1: I performed the above scribed service and the documentation accurately describes the services I performed. I attest to the accuracy of the note.                           I, Joesph Baker PA-C, personally performed the services described in this documentation. All medical record entries made by the scribe were at my direction and in my presence. I have reviewed the chart and agree that the record reflects my personal performance and is accurate and complete.      Clinical Impression:  Final diagnoses:  [R05.9] Cough  [R09.89] Chest congestion  [J20.9] Acute bronchitis, unspecified organism (Primary)          ED Disposition Condition    Discharge Stable          ED Prescriptions       Medication Sig Dispense Start Date End Date Auth. Provider    dextromethorphan-guaiFENesin  mg/5 ml (ROBITUSSIN-DM)  mg/5 mL liquid Take 5 mLs by mouth every 6 (six) hours. for 10 days 473 mL 12/4/2023 12/14/2023 Joesph Baker PA-C    acetaminophen (TYLENOL) 500 MG tablet Take 1 tablet (500 mg total) by mouth every 6 (six) hours as needed for Pain. 30 tablet 12/4/2023 -- Joesph Baker PA-C    phenoL (CHLORASEPTIC THROAT SPRAY) 1.4 % SprA by Mucous Membrane route every 2 (two) hours. 177 mL 12/4/2023 -- Joesph Baker PA-C    ibuprofen (ADVIL,MOTRIN) 600 MG tablet Take 1 tablet (600 mg total) by mouth every 6 (six) hours as needed for Pain. 20 tablet 12/4/2023 -- Joesph Baker PA-C    cetirizine (ZYRTEC) 10 MG tablet Take 1 tablet (10 mg total) by mouth once daily. 30 tablet 12/4/2023 1/3/2024 Joesph Baker PA-C    doxycycline (VIBRAMYCIN) 100 MG Cap Take 1 capsule (100 mg total) by mouth 2 (two) times daily. for 7 days 14 capsule 12/4/2023 12/11/2023 Joesph Baker PA-C          Follow-up Information       Follow up With Specialties Details Why  Contact Info    Sammi Carmona MD Pediatrics Schedule an appointment as soon as possible for a visit in 3 days for follow up [address] [facility]  [address]  [facility] [facility]  [facility] [address]  [phone]               Joesph Baker, PACarolynC  12/04/23 0337

## 2023-12-05 NOTE — DISCHARGE INSTRUCTIONS
Thank you for coming to our Emergency Department today. It is important to remember that some problems or medical conditions are difficult to diagnose and may not be found or addressed during your Emergency Department visit.  These conditions often start with non-specific symptoms and can only be diagnosed on follow up visits with your primary care physician or specialist when the symptoms continue or change. Please remember that all medical conditions can change, and we cannot predict how you will be feeling tomorrow or the next day. Return to the ER with any questions/concerns, new/concerning symptoms, worsening or failure to improve.   Be sure to follow up with your primary care doctor and review all labs/imaging/tests that were performed during your ER visit with them. It is very common for us to identify non-emergent incidental findings which must be followed up with your primary care physician.  Some labs/imaging/tests may be outside of the normal range, and require non-emergent follow-up and/or further investigation/treatment/procedures/testing to help diagnose/exclude/prevent complications or other potentially serious medical conditions. Some abnormalities may not have been discussed or addressed during your ER visit. Some lab results may not return during your ER visit but can be accessible by downloading the free [facility] haresh or by visiting [email or web address] . It is important for you to review all labs/imaging/tests which are outside of the normal range with your physician.  An ER visit does not replace a primary care visit, and many screening tests or follow-up tests cannot be ordered by an ER doctor or performed by the ER. Some tests may even require pre-approval.  If you do not have a primary care doctor, you may contact the one listed on your discharge paperwork or you may also call the [facility] Appointment Desk at [phone] , or [address] [place] [address] at  [phone] to schedule an  appointment, or establish care with a primary care doctor or even a specialist and to obtain information about local resources. It is important to your health that you have a primary care doctor.  Please take all medications as directed. We have done our best to select a medication for you that will treat your condition however, all medications may potentially have side-effects and it is impossible to predict which medications may give you side-effects or what those side-effects (if any) those medications may give you.  If you feel that you are having a negative effect or side-effect of any medication you should stop taking those medications immediately and seek medical attention. If you feel that you are having a life-threatening reaction call 911.  Do not drive, swim, climb to height, take a bath, operate heavy machinery, drink alcohol or take potentially sedating medications, sign any legal documents or make any important decisions for 24 hours if you have received any pain medications, sedatives or mood altering drugs during your ER visit or within 24 hours of taking them if they have been prescribed to you.   You can find additional resources for Dentists, hearing aids, durable medical equipment, low cost pharmacies and other resources at [email or web address]  Patient agrees with this plan. Discussed with her strict return precautions, she verbalized understanding. Patient is stable for discharge.

## 2023-12-05 NOTE — ED NOTES
Pt. Reports she has been having a cough for the past month. Pt. Reports he also has chills and body aches.

## 2025-02-24 ENCOUNTER — HOSPITAL ENCOUNTER (EMERGENCY)
Facility: HOSPITAL | Age: 30
Discharge: HOME | End: 2025-02-24
Attending: STUDENT IN AN ORGANIZED HEALTH CARE EDUCATION/TRAINING PROGRAM
Payer: COMMERCIAL

## 2025-02-24 VITALS
TEMPERATURE: 100 F | HEIGHT: 70 IN | RESPIRATION RATE: 16 BRPM | DIASTOLIC BLOOD PRESSURE: 69 MMHG | HEART RATE: 100 BPM | SYSTOLIC BLOOD PRESSURE: 127 MMHG | OXYGEN SATURATION: 98 % | WEIGHT: 220 LBS | BODY MASS INDEX: 31.5 KG/M2

## 2025-02-24 DIAGNOSIS — R50.9 FEVER IN ADULT: Primary | ICD-10-CM

## 2025-02-24 DIAGNOSIS — J11.1 INFLUENZA: ICD-10-CM

## 2025-02-24 LAB
CTP QC/QA: YES
MOLECULAR STREP A: NEGATIVE
POC MOLECULAR INFLUENZA A AGN: POSITIVE
POC MOLECULAR INFLUENZA B AGN: NEGATIVE
SARS-COV-2 RDRP RESP QL NAA+PROBE: NEGATIVE

## 2025-02-24 PROCEDURE — 87635 SARS-COV-2 COVID-19 AMP PRB: CPT | Performed by: NURSE PRACTITIONER

## 2025-02-24 PROCEDURE — 25000003 PHARM REV CODE 250: Performed by: NURSE PRACTITIONER

## 2025-02-24 PROCEDURE — 87502 INFLUENZA DNA AMP PROBE: CPT

## 2025-02-24 PROCEDURE — 87651 STREP A DNA AMP PROBE: CPT

## 2025-02-24 PROCEDURE — 99284 EMERGENCY DEPT VISIT MOD MDM: CPT

## 2025-02-24 RX ORDER — AZELASTINE 1 MG/ML
1 SPRAY, METERED NASAL 2 TIMES DAILY
Qty: 30 ML | Refills: 0 | Status: SHIPPED | OUTPATIENT
Start: 2025-02-24 | End: 2026-02-24

## 2025-02-24 RX ORDER — OSELTAMIVIR PHOSPHATE 75 MG/1
75 CAPSULE ORAL 2 TIMES DAILY
Qty: 10 CAPSULE | Refills: 0 | Status: SHIPPED | OUTPATIENT
Start: 2025-02-24 | End: 2025-03-01

## 2025-02-24 RX ORDER — CETIRIZINE HYDROCHLORIDE 10 MG/1
10 TABLET ORAL DAILY
Qty: 14 TABLET | Refills: 0 | Status: SHIPPED | OUTPATIENT
Start: 2025-02-24 | End: 2025-03-10

## 2025-02-24 RX ORDER — GUAIFENESIN AND DEXTROMETHORPHAN HYDROBROMIDE 10; 100 MG/5ML; MG/5ML
10 SYRUP ORAL 4 TIMES DAILY PRN
Qty: 120 ML | Refills: 0 | Status: SHIPPED | OUTPATIENT
Start: 2025-02-24 | End: 2025-03-06

## 2025-02-24 RX ORDER — IBUPROFEN 600 MG/1
600 TABLET ORAL
Status: COMPLETED | OUTPATIENT
Start: 2025-02-24 | End: 2025-02-24

## 2025-02-24 RX ADMIN — IBUPROFEN 600 MG: 600 TABLET, FILM COATED ORAL at 06:02

## 2025-02-24 NOTE — Clinical Note
"Bladimir BLANCA "Bladimir" Kvng was seen and treated in our emergency department on 2/24/2025.  He may return to work on 02/28/2025.       If you have any questions or concerns, please don't hesitate to call.      Deric Carlson PA-C"

## 2025-02-25 NOTE — ED TRIAGE NOTES
Pt presents to ED c/o generalized body aches, frequent dry cough, generalized HA, and subjective fever and chills that started yesterday (2/23/25). Pt AAOx4.

## 2025-02-25 NOTE — ED PROVIDER NOTES
Encounter Date: 2/24/2025       History     Chief Complaint   Patient presents with    Cough     Pt arrived in ED, c/o fever, chills, and productive cough since yesterday. Pt denies any CP, SOB, abd pain or n/v/d    Fever     30yo M with chief complaint 2d hx cough, congestion, fever, chills, myalgias, fatigue, generally feeling unwell.     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  He admits to decreased appetite and intake since onset of symptoms.  No emesis.  No abdominal pain.  Does admit to odynophagia.  No otalgia.  Denies alleviating or exacerbating factors.  No radiation of symptoms.    On Biktarvy  Undetectable viral load, asymptomatic; ID Physician: [facility]    PMH:  Syphilis  HIV  HLD      Review of patient's allergies indicates:  No Known Allergies  Past Medical History:   Diagnosis Date    HIV (human immunodeficiency virus infection)      No past surgical history on file.  Family History   Problem Relation Name Age of Onset    Cancer Maternal Grandfather          LUNG    Heart failure Maternal Grandfather      Diabetes Mother      Diabetes Father      Diabetes Maternal Grandmother      Hypertension Maternal Grandmother      Diabetes Paternal Grandmother      Asthma Brother       Social History[1]  Review of Systems   Constitutional:  Positive for appetite change, chills, fatigue and fever.   HENT:  Positive for congestion, rhinorrhea and sore throat.    Eyes:  Negative for discharge and redness.   Respiratory:  Positive for cough.    Gastrointestinal:  Negative for abdominal pain, nausea and vomiting.   Musculoskeletal:  Positive for myalgias. Negative for neck pain and neck stiffness.   Neurological:  Negative for syncope.       Physical Exam     Initial Vitals [02/24/25 1840]   BP Pulse Resp Temp SpO2   133/73 110 20 (!) 102.8 °F (39.3 °C) 95 %      MAP       --         Physical Exam    Nursing note and vitals reviewed.  Constitutional: He appears well-developed and  well-nourished. He is not diaphoretic. No distress.   Ill appearing nontoxic   HENT:   Head: Normocephalic and atraumatic.   Nasal congestion   Neck: Neck supple.   Normal range of motion.  Cardiovascular:            Sinus tachycardia   Pulmonary/Chest: Breath sounds normal. No respiratory distress.   Musculoskeletal:      Cervical back: Normal range of motion and neck supple.     Neurological: He is alert and oriented to person, place, and time. GCS score is 15. GCS eye subscore is 4. GCS verbal subscore is 5. GCS motor subscore is 6.   Skin: Skin is warm.   Psychiatric: He has a normal mood and affect. Thought content normal.         ED Course   Procedures  Labs Reviewed   POCT INFLUENZA A/B MOLECULAR - Abnormal       Result Value    POC Molecular Influenza A Ag Positive (*)     POC Molecular Influenza B Ag Negative       Acceptable Yes     POCT STREP A MOLECULAR    Molecular Strep A, POC Negative       Acceptable Yes     SARS-COV-2 RDRP GENE    POC Rapid COVID Negative       Acceptable Yes            Imaging Results    None          Medications   ibuprofen tablet 600 mg (600 mg Oral Given 2/24/25 1845)     Medical Decision Making  Differential diagnosis: Viral URI, pneumonia, bronchitis, pharyngitis, rhinitis, sinusitis, dehydration    Amount and/or Complexity of Data Reviewed  Discussion of management or test interpretation with external provider(s): Flu positive, consistent with symptoms and presentation.  Temp improved following antipyretics given in the ED. tolerating p.o..  HIV positive, but compliant with Biktarvy, undetectable viral load, local ID physician.  Lungs are clear.  No hypoxia.  Low suspicion for viral pneumonia.  At this time, I do feel he can be safely discharged and follow up in the outpatient setting.  Discussed appropriate fever control, vigorous p.o. hydration, symptomatic/supportive medications.  Will give Tamiflu based upon CDC  recommendations, history of HIV.  Discussed return precautions and red flags.  Patient comfortable with plan.    Risk  OTC drugs.  Prescription drug management.                                      Clinical Impression:  Final diagnoses:  [R50.9] Fever in adult (Primary)  [J11.1] Influenza          ED Disposition Condition    Discharge Stable          ED Prescriptions       Medication Sig Dispense Start Date End Date Auth. Provider    oseltamivir (TAMIFLU) 75 MG capsule Take 1 capsule (75 mg total) by mouth 2 (two) times daily. for 5 days 10 capsule 2/24/2025 3/1/2025 Deric Carlson PA-C    dextromethorphan-guaiFENesin  mg/5 ml (ROBITUSSIN-DM)  mg/5 mL liquid Take 10 mLs by mouth 4 (four) times daily as needed (cough). 120 mL 2/24/2025 3/6/2025 Deric Carlson PA-C    cetirizine (ZYRTEC) 10 MG tablet Take 1 tablet (10 mg total) by mouth once daily. for 14 days 14 tablet 2/24/2025 3/10/2025 Deric Carlson PA-C    azelastine (ASTELIN) 137 mcg (0.1 %) nasal spray 1 spray (137 mcg total) by Nasal route 2 (two) times daily. 30 mL 2/24/2025 2/24/2026 Deric Carlson PA-C          Follow-up Information       Follow up With Specialties Details Why Contact Info    Primary care provider  Schedule an appointment as soon as possible for a visit  As needed, If symptoms persist     [facility] Emergency Dept Emergency Medicine  As needed, If symptoms worsen [address] [facility] [place] [address] [phone]                 [1]   Social History  Tobacco Use    Smoking status: Never    Smokeless tobacco: Never   Substance Use Topics    Alcohol use: No     Comment: OCCAS.    Drug use: No        Deric Carlson PA-C  02/25/25 0259

## 2025-02-25 NOTE — DISCHARGE INSTRUCTIONS
Drink lots of fluids, stay well hydrated. Tylenol/Ibuprofen as needed for discomfort; go back and forth between these two medications every 4 hrs as needed for temp greater than or equal to 100.4F, as needed for congestion/headache/body aches. Zyrtec and Astelin for congestion. Robitussin for cough.  Tamiflu twice daily.    Follow-up with your primary care provider for reevaluation, further recommendations. Return to this ED if unable to treat fever, if symptoms persist or worsen despite treatment, if you begin with shortness of breath or difficulty breathing, if any other problems occur.

## 2025-02-25 NOTE — FIRST PROVIDER EVALUATION
"Medical screening examination initiated.  I have conducted a focused provider triage encounter, findings are as follows:    Brief history of present illness:  fever, chills, cough, and sore throat for 2 days    Vitals:    02/24/25 1840   BP: 133/73   BP Location: Left arm   Pulse: 110   Resp: 20   Temp: (!) 102.8 °F (39.3 °C)   TempSrc: Oral   SpO2: 95%   Weight: 99.8 kg (220 lb)   Height: 5' 10" (1.778 m)       Pertinent physical exam:  NAD    Brief workup plan:  Ibuprofen, COVID, Flu    Preliminary workup initiated; this workup will be continued and followed by the physician or advanced practice provider that is assigned to the patient when roomed.  "